# Patient Record
Sex: MALE | Race: WHITE | Employment: FULL TIME | URBAN - METROPOLITAN AREA
[De-identification: names, ages, dates, MRNs, and addresses within clinical notes are randomized per-mention and may not be internally consistent; named-entity substitution may affect disease eponyms.]

---

## 2019-05-12 LAB
CHOLESTEROL, TOTAL: 162 MG/DL
CHOLESTEROL/HDL RATIO: 11
HDLC SERPL-MCNC: 50 MG/DL (ref 35–70)
LDL CHOLESTEROL CALCULATED: 101 MG/DL (ref 0–160)
TRIGL SERPL-MCNC: 57 MG/DL
VLDLC SERPL CALC-MCNC: NORMAL MG/DL

## 2019-10-24 ASSESSMENT — ENCOUNTER SYMPTOMS
WHEEZING: 0
CONSTIPATION: 0
NAUSEA: 0
BLOOD IN STOOL: 0
BACK PAIN: 0
SHORTNESS OF BREATH: 0
DIARRHEA: 0
ABDOMINAL PAIN: 0
SINUS PAIN: 0
VOMITING: 0
COUGH: 0
SORE THROAT: 0

## 2019-10-28 ENCOUNTER — OFFICE VISIT (OUTPATIENT)
Dept: INTERNAL MEDICINE CLINIC | Age: 39
End: 2019-10-28

## 2019-10-28 VITALS — DIASTOLIC BLOOD PRESSURE: 86 MMHG | HEART RATE: 72 BPM | SYSTOLIC BLOOD PRESSURE: 122 MMHG

## 2019-10-28 DIAGNOSIS — E78.2 MIXED HYPERLIPIDEMIA: ICD-10-CM

## 2019-10-28 DIAGNOSIS — E11.9 TYPE 2 DIABETES MELLITUS WITHOUT COMPLICATION, WITHOUT LONG-TERM CURRENT USE OF INSULIN (HCC): ICD-10-CM

## 2019-10-28 DIAGNOSIS — E89.0 POSTOPERATIVE HYPOTHYROIDISM: ICD-10-CM

## 2019-10-28 DIAGNOSIS — Z76.89 ENCOUNTER TO ESTABLISH CARE WITH NEW DOCTOR: Primary | ICD-10-CM

## 2019-10-28 LAB
A/G RATIO: 1.7 (ref 1.1–2.2)
ALBUMIN SERPL-MCNC: 4 G/DL (ref 3.4–5)
ALP BLD-CCNC: 88 U/L (ref 40–129)
ALT SERPL-CCNC: 14 U/L (ref 10–40)
ANION GAP SERPL CALCULATED.3IONS-SCNC: 14 MMOL/L (ref 3–16)
AST SERPL-CCNC: 12 U/L (ref 15–37)
BASOPHILS ABSOLUTE: 0 K/UL (ref 0–0.2)
BASOPHILS RELATIVE PERCENT: 0.7 %
BILIRUB SERPL-MCNC: 0.5 MG/DL (ref 0–1)
BUN BLDV-MCNC: 10 MG/DL (ref 7–20)
CALCIUM SERPL-MCNC: 8.8 MG/DL (ref 8.3–10.6)
CHLORIDE BLD-SCNC: 101 MMOL/L (ref 99–110)
CO2: 27 MMOL/L (ref 21–32)
CREAT SERPL-MCNC: 0.7 MG/DL (ref 0.9–1.3)
CREATININE URINE: 134.5 MG/DL (ref 39–259)
EOSINOPHILS ABSOLUTE: 0.4 K/UL (ref 0–0.6)
EOSINOPHILS RELATIVE PERCENT: 8 %
GFR AFRICAN AMERICAN: >60
GFR NON-AFRICAN AMERICAN: >60
GLOBULIN: 2.4 G/DL
GLUCOSE BLD-MCNC: 217 MG/DL (ref 70–99)
HBA1C MFR BLD: 7.4 %
HCT VFR BLD CALC: 41.5 % (ref 40.5–52.5)
HEMOGLOBIN: 14.3 G/DL (ref 13.5–17.5)
LYMPHOCYTES ABSOLUTE: 1.5 K/UL (ref 1–5.1)
LYMPHOCYTES RELATIVE PERCENT: 28.1 %
MCH RBC QN AUTO: 31 PG (ref 26–34)
MCHC RBC AUTO-ENTMCNC: 34.5 G/DL (ref 31–36)
MCV RBC AUTO: 90 FL (ref 80–100)
MICROALBUMIN UR-MCNC: <1.2 MG/DL
MICROALBUMIN/CREAT UR-RTO: NORMAL MG/G (ref 0–30)
MONOCYTES ABSOLUTE: 0.3 K/UL (ref 0–1.3)
MONOCYTES RELATIVE PERCENT: 5.7 %
NEUTROPHILS ABSOLUTE: 3.1 K/UL (ref 1.7–7.7)
NEUTROPHILS RELATIVE PERCENT: 57.5 %
PDW BLD-RTO: 13.4 % (ref 12.4–15.4)
PLATELET # BLD: 212 K/UL (ref 135–450)
PMV BLD AUTO: 8 FL (ref 5–10.5)
POTASSIUM SERPL-SCNC: 4 MMOL/L (ref 3.5–5.1)
RBC # BLD: 4.61 M/UL (ref 4.2–5.9)
SODIUM BLD-SCNC: 142 MMOL/L (ref 136–145)
TOTAL PROTEIN: 6.4 G/DL (ref 6.4–8.2)
TSH SERPL DL<=0.05 MIU/L-ACNC: 0.9 UIU/ML (ref 0.27–4.2)
WBC # BLD: 5.4 K/UL (ref 4–11)

## 2019-10-28 PROCEDURE — 83036 HEMOGLOBIN GLYCOSYLATED A1C: CPT | Performed by: NURSE PRACTITIONER

## 2019-10-28 PROCEDURE — 99385 PREV VISIT NEW AGE 18-39: CPT | Performed by: NURSE PRACTITIONER

## 2019-10-28 RX ORDER — LEVOTHYROXINE SODIUM 0.12 MG/1
TABLET ORAL
Qty: 90 TABLET | Refills: 0 | Status: SHIPPED | OUTPATIENT
Start: 2019-10-28 | End: 2020-06-19 | Stop reason: SDUPTHER

## 2019-10-28 RX ORDER — LEVOTHYROXINE SODIUM 0.12 MG/1
TABLET ORAL
COMMUNITY
Start: 2015-05-19 | End: 2019-10-28 | Stop reason: SDUPTHER

## 2019-10-28 RX ORDER — METFORMIN HYDROCHLORIDE 500 MG/1
1000 TABLET, EXTENDED RELEASE ORAL
Qty: 180 TABLET | Refills: 0 | Status: SHIPPED | OUTPATIENT
Start: 2019-10-28 | End: 2020-06-19 | Stop reason: SDUPTHER

## 2019-10-28 ASSESSMENT — PATIENT HEALTH QUESTIONNAIRE - PHQ9
SUM OF ALL RESPONSES TO PHQ QUESTIONS 1-9: 1
SUM OF ALL RESPONSES TO PHQ9 QUESTIONS 1 & 2: 1
1. LITTLE INTEREST OR PLEASURE IN DOING THINGS: 0
SUM OF ALL RESPONSES TO PHQ QUESTIONS 1-9: 1
2. FEELING DOWN, DEPRESSED OR HOPELESS: 1

## 2020-03-15 ENCOUNTER — HOSPITAL ENCOUNTER (EMERGENCY)
Age: 40
Discharge: HOME | End: 2020-03-15
Attending: EMERGENCY MEDICINE
Payer: COMMERCIAL

## 2020-03-15 VITALS
TEMPERATURE: 96.7 F | HEART RATE: 76 BPM | OXYGEN SATURATION: 97 % | HEIGHT: 67 IN | SYSTOLIC BLOOD PRESSURE: 126 MMHG | DIASTOLIC BLOOD PRESSURE: 92 MMHG | WEIGHT: 190 LBS | BODY MASS INDEX: 29.82 KG/M2 | RESPIRATION RATE: 16 BRPM

## 2020-03-15 LAB
RAPID INFLUENZA  B AGN: NEGATIVE
RAPID INFLUENZA A AGN: NEGATIVE
S PYO AG THROAT QL: NEGATIVE

## 2020-03-15 PROCEDURE — 99282 EMERGENCY DEPT VISIT SF MDM: CPT

## 2020-03-15 PROCEDURE — 87804 INFLUENZA ASSAY W/OPTIC: CPT

## 2020-03-15 PROCEDURE — 87081 CULTURE SCREEN ONLY: CPT

## 2020-03-15 PROCEDURE — 87880 STREP A ASSAY W/OPTIC: CPT

## 2020-03-15 PROCEDURE — 6370000000 HC RX 637 (ALT 250 FOR IP): Performed by: EMERGENCY MEDICINE

## 2020-03-15 RX ORDER — IBUPROFEN 200 MG
400 TABLET ORAL ONCE
Status: COMPLETED | OUTPATIENT
Start: 2020-03-15 | End: 2020-03-15

## 2020-03-15 RX ADMIN — IBUPROFEN 400 MG: 200 TABLET, FILM COATED ORAL at 08:10

## 2020-03-15 ASSESSMENT — PAIN SCALES - GENERAL: PAINLEVEL_OUTOF10: 2

## 2020-03-15 NOTE — ED PROVIDER NOTES
Noncontributory    PHYSICAL EXAM  VITAL SIGNS:  Blood pressure (!) 126/92, pulse 76, temperature 96.7 °F (35.9 °C), temperature source Oral, resp. rate 16, height 5' 7\" (1.702 m), weight 190 lb (86.2 kg), SpO2 97 %. Constitutional:  42 y.o. male nontoxic, tolerates secretions normally  HENT:  Atraumatic, mucous membranes moist, no trismus, uvula midline, TMs clear, throat appears normal  Eyes:   Conjunctiva clear, no icterus  Neck:  Supple, trachea midline, no stridor, no adenopathy  Thorax & Lungs:  Respiratory effort normal, no murmur  Abdomen:  Non distended, no HSM appreciated  Back:  No deformity  Extremities:  No cyanosis, no edema  Skin:  Warm, dry, no facial or extremity rash appreciated  Neurologic:  Alert, normal coordination, normal voice and speech    DIAGNOSTIC RESULTS:    Labs Reviewed   RAPID INFLUENZA A/B ANTIGENS    Narrative:     Performed at:  [facility]  [address] [facility], [address]   Phone [phone]   STREP SCREEN GROUP A THROAT    Narrative:     Performed at:  [facility]  [address] [facility],  [place], [address]   Phone [phone]   CULTURE, BETA STREP CONFIRM PLATES     ED COURSE:    Medications administered:  Medications   ibuprofen (ADVIL;MOTRIN) tablet 400 mg (400 mg Oral Given 3/15/20 0810)     PROCEDURES:  None    CONSULTATIONS:  None    MEDICAL DECISION MAKING: Pratik Green is a 42 y.o. male who presented with sorethroat. The patient is adequately hydrated, clinically nontoxic, and does not have any findings that would suggest impending airway issues. Pratik Green was given appropriate discharge instructions. Referral to follow up provider.    Differential diagnosis:  Peritonsillar abscess, epiglottitis, retropharyngeal abscess, foreign body, Mauro's angina, dehydration, infectious mono, Strep, other    FOLLOW UP:    DEA Valero - CNP  [address] [facility] [address]  [phone]    Schedule an appointment as soon as possible for a visit       [facility] Emergency Department  [address]  Go to       FINAL IMPRESSION:    1. Acute pharyngitis, unspecified etiology      (Please note that I used voice recognition software to generate this note.   Occasionally words are mistranscribed despite my efforts to edit errors.)        Segun Alva MD  03/15/20 6906

## 2020-03-16 ENCOUNTER — TELEPHONE (OUTPATIENT)
Dept: INTERNAL MEDICINE CLINIC | Age: 40
End: 2020-03-16

## 2020-03-17 LAB — S PYO THROAT QL CULT: NORMAL

## 2020-06-19 ENCOUNTER — VIRTUAL VISIT (OUTPATIENT)
Dept: INTERNAL MEDICINE CLINIC | Age: 40
End: 2020-06-19
Payer: COMMERCIAL

## 2020-06-19 LAB — HBA1C MFR BLD: 10.5 %

## 2020-06-19 PROCEDURE — 3046F HEMOGLOBIN A1C LEVEL >9.0%: CPT | Performed by: NURSE PRACTITIONER

## 2020-06-19 PROCEDURE — 1036F TOBACCO NON-USER: CPT | Performed by: NURSE PRACTITIONER

## 2020-06-19 PROCEDURE — 83036 HEMOGLOBIN GLYCOSYLATED A1C: CPT | Performed by: NURSE PRACTITIONER

## 2020-06-19 PROCEDURE — G8419 CALC BMI OUT NRM PARAM NOF/U: HCPCS | Performed by: NURSE PRACTITIONER

## 2020-06-19 PROCEDURE — G8427 DOCREV CUR MEDS BY ELIG CLIN: HCPCS | Performed by: NURSE PRACTITIONER

## 2020-06-19 PROCEDURE — 2022F DILAT RTA XM EVC RTNOPTHY: CPT | Performed by: NURSE PRACTITIONER

## 2020-06-19 PROCEDURE — 99213 OFFICE O/P EST LOW 20 MIN: CPT | Performed by: NURSE PRACTITIONER

## 2020-06-19 RX ORDER — LEVOTHYROXINE SODIUM 0.12 MG/1
TABLET ORAL
Qty: 90 TABLET | Refills: 0 | Status: SHIPPED | OUTPATIENT
Start: 2020-06-19 | End: 2020-07-16 | Stop reason: SDUPTHER

## 2020-06-19 RX ORDER — LANCETS 30 GAUGE
1 EACH MISCELLANEOUS DAILY
Qty: 100 EACH | Refills: 0 | Status: SHIPPED | OUTPATIENT
Start: 2020-06-19 | End: 2021-05-06 | Stop reason: SDUPTHER

## 2020-06-19 RX ORDER — BLOOD PRESSURE TEST KIT
KIT MISCELLANEOUS
Qty: 100 EACH | Refills: 0 | Status: SHIPPED | OUTPATIENT
Start: 2020-06-19

## 2020-06-19 RX ORDER — METFORMIN HYDROCHLORIDE 500 MG/1
1000 TABLET, EXTENDED RELEASE ORAL
Qty: 180 TABLET | Refills: 0 | Status: SHIPPED | OUTPATIENT
Start: 2020-06-19 | End: 2020-06-19

## 2020-06-19 SDOH — ECONOMIC STABILITY: TRANSPORTATION INSECURITY
IN THE PAST 12 MONTHS, HAS THE LACK OF TRANSPORTATION KEPT YOU FROM MEDICAL APPOINTMENTS OR FROM GETTING MEDICATIONS?: NO

## 2020-06-19 SDOH — ECONOMIC STABILITY: FOOD INSECURITY: WITHIN THE PAST 12 MONTHS, THE FOOD YOU BOUGHT JUST DIDN'T LAST AND YOU DIDN'T HAVE MONEY TO GET MORE.: NEVER TRUE

## 2020-06-19 SDOH — ECONOMIC STABILITY: FOOD INSECURITY: WITHIN THE PAST 12 MONTHS, YOU WORRIED THAT YOUR FOOD WOULD RUN OUT BEFORE YOU GOT MONEY TO BUY MORE.: NEVER TRUE

## 2020-06-19 SDOH — ECONOMIC STABILITY: INCOME INSECURITY: HOW HARD IS IT FOR YOU TO PAY FOR THE VERY BASICS LIKE FOOD, HOUSING, MEDICAL CARE, AND HEATING?: NOT HARD AT ALL

## 2020-06-19 SDOH — ECONOMIC STABILITY: TRANSPORTATION INSECURITY
IN THE PAST 12 MONTHS, HAS LACK OF TRANSPORTATION KEPT YOU FROM MEETINGS, WORK, OR FROM GETTING THINGS NEEDED FOR DAILY LIVING?: NO

## 2020-06-19 ASSESSMENT — ENCOUNTER SYMPTOMS
WHEEZING: 0
ABDOMINAL PAIN: 0
VOMITING: 0
CONSTIPATION: 0
DIARRHEA: 0
COUGH: 0
NAUSEA: 0
SHORTNESS OF BREATH: 0

## 2020-06-19 ASSESSMENT — PATIENT HEALTH QUESTIONNAIRE - PHQ9
2. FEELING DOWN, DEPRESSED OR HOPELESS: 0
1. LITTLE INTEREST OR PLEASURE IN DOING THINGS: 0
SUM OF ALL RESPONSES TO PHQ9 QUESTIONS 1 & 2: 0
SUM OF ALL RESPONSES TO PHQ QUESTIONS 1-9: 0
SUM OF ALL RESPONSES TO PHQ QUESTIONS 1-9: 0

## 2020-06-19 NOTE — PROGRESS NOTES
TELEHEALTH EVALUATION -- Audio/Visual (During AEEUE-52 public health emergency)  Office Visit   6/19/2020    Subjective:  Chief Complaint   Patient presents with    Thyroid Problem     refills pended    Diabetes     needs labs     HPI:   Carmenza Moser is a 42 y.o. male who presents today for follow up. Mixed hyperlipidemia -   Uses lifestyle modifications. Hypothyroidism - States he had a thyroid mass so had his thyroid removed. Now he takes synthroid 125 mcg daily. No side effects. Has been on this dose for over 1 year per pt.     Type 2 diabetes-  Prescribed metformin 1000 mg daily- ran out of this medication 1 month ago. He is trying to exercise more- going to gym until COVID-19 outbreak when gyms closed. He states he watches his diet - \"not too bad. \" Cutting down on carbs. Does not check fasting sugars at home. No acute concerns. Review of Systems   Constitutional: Negative for chills, fatigue, fever and unexpected weight change. Eyes: Negative for visual disturbance. Respiratory: Negative for cough, shortness of breath and wheezing. Cardiovascular: Negative for chest pain, palpitations and leg swelling. Gastrointestinal: Negative for abdominal pain, constipation, diarrhea, nausea and vomiting. Skin: Negative for pallor and rash. Neurological: Negative for dizziness, weakness, light-headedness, numbness and headaches. Psychiatric/Behavioral: Negative for dysphoric mood, self-injury, sleep disturbance and suicidal ideas. The patient is not nervous/anxious.       No Known Allergies    Current Outpatient Rx   Medication Sig Dispense Refill    levothyroxine (SYNTHROID) 125 MCG tablet TAKE 1 TAB BY MOUTH UPON WAKING AND WAIT 1 HOUR BEFORE EATING ANYTHING 90 tablet 0    metFORMIN (GLUCOPHAGE) 1000 MG tablet Take 1 tablet by mouth 2 times daily (with meals) 60 tablet 0    Alcohol Swabs PADS Use with blood sugar monitoring 100 each 0    Lancets MISC 1 each by Does not apply route their individual homes. All questions answered. Pt states no further questions or concerns at this time.    Electronically signed by: Alvino Watson 06/19/20

## 2020-07-15 ASSESSMENT — ENCOUNTER SYMPTOMS
NAUSEA: 0
WHEEZING: 0
ABDOMINAL PAIN: 0
SHORTNESS OF BREATH: 0
DIARRHEA: 0
COUGH: 0
VOMITING: 0
CONSTIPATION: 0

## 2020-07-15 NOTE — PROGRESS NOTES
TELEHEALTH EVALUATION -- Audio/Visual (During PXAUB-44 public health emergency)  Office Visit   7/16/2020    Subjective:  Chief Complaint   Patient presents with    Diabetes     HPI:   Kika Ariza is a 47 y.o. male who presents today for follow up. Mixed hyperlipidemia -   Uses lifestyle modifications.     Hypothyroidism - States he had a thyroid mass so had his thyroid removed. Now he takes synthroid 125 mcg daily. No side effects. Asymptomatic.      Type 2 diabetes-  Prescribed metformin 1000 mg BID. Fasting sugars 190-300 at home. No episodes of hypoglycemia. Trying to start the Keto diet. Walking more. No acute concerns. Review of Systems   Constitutional: Negative for chills, fatigue, fever and unexpected weight change. Eyes: Negative for visual disturbance. Respiratory: Negative for cough, shortness of breath and wheezing. Cardiovascular: Negative for chest pain, palpitations and leg swelling. Gastrointestinal: Negative for abdominal pain, constipation, diarrhea, nausea and vomiting. Skin: Negative for pallor and rash. Neurological: Negative for dizziness, weakness, light-headedness, numbness and headaches. No Known Allergies    Current Outpatient Rx   Medication Sig Dispense Refill    metFORMIN (GLUCOPHAGE) 1000 MG tablet Take 1 tablet by mouth 2 times daily (with meals) 180 tablet 0    glipiZIDE (GLUCOTROL XL) 5 MG extended release tablet Take 1 tablet by mouth daily 90 tablet 0    levothyroxine (SYNTHROID) 125 MCG tablet TAKE 1 TAB BY MOUTH UPON WAKING AND WAIT 1 HOUR BEFORE EATING ANYTHING 90 tablet 0    Alcohol Swabs PADS Use with blood sugar monitoring 100 each 0    Lancets MISC 1 each by Does not apply route daily 100 each 0    blood glucose monitor kit and supplies Test 1 time a day fasting & as needed for symptoms of irregular blood glucose.  1 kit 0     Patient Active Problem List   Diagnosis    Type 2 diabetes mellitus without complication, without long-term current use of insulin (HCC)    Postoperative hypothyroidism    Mixed hyperlipidemia      Wt Readings from Last 3 Encounters:   03/15/20 190 lb (86.2 kg)     BP Readings from Last 3 Encounters:   03/15/20 (!) 126/92   10/28/19 122/86     Objective/Physical Exam:  Virtual Video Exam  Patient-Reported Vitals 7/14/2020   Patient-Reported Weight 190   Patient-Reported Height 5,7   Patient-Reported Systolic -   Patient-Reported Diastolic -   Patient-Reported Temperature 97.1    ^no access to other VS d/t VV per pt  Physical Exam  Constitutional:       General: He is not in acute distress. Appearance: Normal appearance. He is not ill-appearing. Pulmonary:      Effort: Pulmonary effort is normal. No respiratory distress. Skin:     Coloration: Skin is not jaundiced or pale. Neurological:      Mental Status: He is alert. Comments: No facial asymmetry noted   Psychiatric:         Mood and Affect: Mood normal.     Due to this being a TeleHealth encounter, evaluation of the following organ systems is limited: Vitals/Constitutional/EENT/Resp/CV/GI//MS/Neuro/Skin/Heme-Lymph-Imm. Assessment and Plan:  Isauro Rivera was seen today for diabetes. Diagnoses and all orders for this visit:    Type 2 diabetes mellitus without complication, without long-term current use of insulin (HCC)  -     POCT glycosylated hemoglobin (Hb A1C)-9.6%  - Lifestyle modifications such as exercise, weight loss and healthy diet encouraged and reviewed with the pt. - Add glipizide. Pt agreeable. Continue metformin.  -     metFORMIN (GLUCOPHAGE) 1000 MG tablet; Take 1 tablet by mouth 2 times daily (with meals)- refilled today  -     glipiZIDE (GLUCOTROL XL) 5 MG extended release tablet; Take 1 tablet by mouth daily - patient education handout provided and reviewed with the pt. - Pt will monitor for hypoglycemia and call if this occurs. Postoperative hypothyroidism  -    Asymptoamtic. Takes medication daily. Last TSH stable.  TSH next visit.    - levothyroxine (SYNTHROID) 125 MCG tablet; TAKE 1 TAB BY MOUTH UPON WAKING AND WAIT 1 HOUR BEFORE EATING ANYTHING-refilled today    Mixed hyperlipidemia   - Lifestyle modifications such as exercise, weight loss and healthy diet encouraged and reviewed with the pt. Return in about 3 months (around 10/16/2020) for annual exam and DM/HTN/TSH f/u. Pt will call if symptoms worsen or fail to improve. Tamanna Aldridge is a 47 y.o. male being evaluated by a Virtual Visit (video visit) encounter to address concerns as mentioned above. A caregiver was present when appropriate. Due to this being a TeleHealth encounter (During [facility] public health emergency), evaluation of the following organ systems was limited: Vitals/Constitutional/EENT/Resp/CV/GI//MS/Neuro/Skin/Heme-Lymph-Imm. Pursuant to the emergency declaration under the [address] [place] [address], [address] [place] [address] and the [employer] and [employer] Act, this Virtual Visit was conducted with patient's (and/or legal guardian's) consent, to reduce the patient's risk of exposure to COVID-19 and provide necessary medical care. The patient (and/or legal guardian) has also been advised to contact this office for worsening conditions or problems, and seek emergency medical treatment and/or call 911 if deemed necessary. Patient identification was verified at the start of the visit: Yes    Total time spent on this encounter: Not billed by time    Services were provided through a video synchronous discussion virtually to substitute for in-person clinic visit. Patient and provider were located at their individual homes. All questions answered. Pt states no further questions or concerns at this time.    Electronically signed by: Pat Butts 07/16/20

## 2020-07-16 ENCOUNTER — VIRTUAL VISIT (OUTPATIENT)
Dept: INTERNAL MEDICINE CLINIC | Age: 40
End: 2020-07-16
Payer: COMMERCIAL

## 2020-07-16 LAB — HBA1C MFR BLD: 9.6 %

## 2020-07-16 PROCEDURE — 2022F DILAT RTA XM EVC RTNOPTHY: CPT | Performed by: NURSE PRACTITIONER

## 2020-07-16 PROCEDURE — G8427 DOCREV CUR MEDS BY ELIG CLIN: HCPCS | Performed by: NURSE PRACTITIONER

## 2020-07-16 PROCEDURE — 1036F TOBACCO NON-USER: CPT | Performed by: NURSE PRACTITIONER

## 2020-07-16 PROCEDURE — G8419 CALC BMI OUT NRM PARAM NOF/U: HCPCS | Performed by: NURSE PRACTITIONER

## 2020-07-16 PROCEDURE — 99213 OFFICE O/P EST LOW 20 MIN: CPT | Performed by: NURSE PRACTITIONER

## 2020-07-16 PROCEDURE — 83036 HEMOGLOBIN GLYCOSYLATED A1C: CPT | Performed by: NURSE PRACTITIONER

## 2020-07-16 PROCEDURE — 3046F HEMOGLOBIN A1C LEVEL >9.0%: CPT | Performed by: NURSE PRACTITIONER

## 2020-07-16 RX ORDER — LEVOTHYROXINE SODIUM 0.12 MG/1
TABLET ORAL
Qty: 90 TABLET | Refills: 0 | Status: SHIPPED | OUTPATIENT
Start: 2020-07-16 | End: 2020-09-22 | Stop reason: SDUPTHER

## 2020-07-16 RX ORDER — GLIPIZIDE 5 MG/1
5 TABLET, FILM COATED, EXTENDED RELEASE ORAL DAILY
Qty: 90 TABLET | Refills: 0 | Status: SHIPPED | OUTPATIENT
Start: 2020-07-16 | End: 2020-10-13 | Stop reason: SDUPTHER

## 2020-07-16 NOTE — PATIENT INSTRUCTIONS
Patient Education   glipizide  Pronunciation:  GLIHANS block  Brand:  Glucotrol  What is the most important information I should know about glipizide? You should not use glipizide if you have diabetic ketoacidosis (call your doctor for treatment). What is glipizide? Glipizide is an oral diabetes medicine that helps control blood sugar levels by helping your pancreas produce insulin. Glipizide is used together with diet and exercise to improve blood sugar control in adults with type 2 diabetes mellitus. Glipizide is not for treating type 1 diabetes. Glipizide may also be used for purposes not listed in this medication guide. What should I discuss with my healthcare provider before taking glipizide? You should not use this medicine if you are allergic to glipizide, or if you have diabetic ketoacidosis (call your doctor for treatment). Tell your doctor if you have ever had:  · liver or kidney disease;  · chronic diarrhea, or a blockage in your intestines; or  · an enzyme deficiency called glucose-6-phosphate dehydrogenase deficiency (G6PD). Follow your doctor's instructions about using this medicine if you are pregnant. Blood sugar control is very important during pregnancy, and your dose needs may be different during each trimester. You should not take glipizide during the last 2 weeks of pregnancy. It may not be safe to breast-feed while using this medicine. Ask your doctor about any risk. How should I take glipizide? Follow all directions on your prescription label. Your doctor may occasionally change your dose. Do not take this medicine in larger or smaller amounts or for longer than recommended. Take the glipizide regular tablet 30 minutes before your first meal of the day. Take the glipizide extended-release tablet with your first meal of the day. Swallow the tablet whole and do not crush, chew, or break it.   Your blood sugar will need to be checked often, and you may need other blood tests at effects. Avoid driving or operating machinery until you know how this medicine will affect you. What are the possible side effects of glipizide? Get emergency medical help if you have signs of an allergic reaction: hives; difficulty breathing; swelling of your face, lips, tongue, or throat. Call your doctor at once if you have symptoms of low blood sugar:  · headache, irritability  · sweating, fast heart rate;  · dizziness, nausea; or  · hunger, feeling anxious or shaky. Common side effects may include:  · diarrhea, constipation, gas;  · dizziness, drowsiness;  · tremors; or  · skin rash, redness, or itching. This is not a complete list of side effects and others may occur. Call your doctor for medical advice about side effects. You may report side effects to FDA at 5-577-FDA-8258. What other drugs will affect glipizide? Sometimes it is not safe to use certain medications at the same time. Some drugs can affect your blood levels of other drugs you take, which may increase side effects or make the medications less effective. Many drugs can affect glipizide. This includes prescription and over-the-counter medicines, vitamins, and herbal products. Not all possible interactions are listed here. Tell your doctor about all your current medicines and any medicine you start or stop using. Where can I get more information? Your pharmacist can provide more information about glipizide. Remember, keep this and all other medicines out of the reach of children, never share your medicines with others, and use this medication only for the indication prescribed. Every effort has been made to ensure that the information provided by [address] is accurate, up-to-date, and complete, but no guarantee is made to that effect. Drug information contained herein may be time sensitive.  [employer] information has been compiled for use by healthcare practitioners and consumers in the [place] and therefore [facility] does not warrant that uses outside of the [facility] are appropriate, unless specifically indicated otherwise. [facility]'s drug information does not endorse drugs, diagnose patients or recommend therapy. [facility] drug information is an informational resource designed to assist licensed healthcare practitioners in caring for their patients and/or to serve consumers viewing this service as a supplement to, and not a substitute for, the expertise, skill, knowledge and judgment of healthcare practitioners. The absence of a warning for a given drug or drug combination in no way should be construed to indicate that the drug or drug combination is safe, effective or appropriate for any given patient. [facility] does not assume any responsibility for any aspect of healthcare administered with the aid of information [facility] provides. The information contained herein is not intended to cover all possible uses, directions, precautions, warnings, drug interactions, allergic reactions, or adverse effects. If you have questions about the drugs you are taking, check with your doctor, nurse or pharmacist.  Copyright 9406-1768 [address]. Version: 11.01. Revision date: 8/16/2018. Care instructions adapted under license by [facility] ([facility]). If you have questions about a medical condition or this instruction, always ask your healthcare professional. [place] [address] any warranty or liability for your use of this information.

## 2020-08-11 ENCOUNTER — TELEPHONE (OUTPATIENT)
Dept: INTERNAL MEDICINE CLINIC | Age: 40
End: 2020-08-11

## 2020-08-11 NOTE — TELEPHONE ENCOUNTER
Based on CDC guidelines, I recommend the pt wear a mask. This is to protect himself against COVID-19. If he would like to discuss this further, VV please.

## 2020-08-11 NOTE — TELEPHONE ENCOUNTER
Patient advised. Still requests note because the mask causes him to hyperventilate. He states the letter can be for a face shield if not a mask. Did not want to schedule appt at this time.

## 2020-08-11 NOTE — TELEPHONE ENCOUNTER
According to the CDC: \"A face shield is primarily used for eye protection for the person wearing it. At this time, it is not known what level of protection a face shield provides to people nearby from the spray of respiratory droplets from the wearer. There is currently not enough evidence to support the effectiveness of face shields for source control. Therefore, CDC does not currently recommend use of face shields as a substitute for masks. \"

## 2020-10-03 ENCOUNTER — HOSPITAL ENCOUNTER (EMERGENCY)
Age: 40
Discharge: HOME | End: 2020-10-03
Attending: EMERGENCY MEDICINE
Payer: COMMERCIAL

## 2020-10-03 VITALS
TEMPERATURE: 98.6 F | HEART RATE: 71 BPM | WEIGHT: 191 LBS | BODY MASS INDEX: 29.91 KG/M2 | OXYGEN SATURATION: 98 % | DIASTOLIC BLOOD PRESSURE: 88 MMHG | SYSTOLIC BLOOD PRESSURE: 139 MMHG | RESPIRATION RATE: 16 BRPM

## 2020-10-03 LAB — S PYO AG THROAT QL: NEGATIVE

## 2020-10-03 PROCEDURE — 87081 CULTURE SCREEN ONLY: CPT

## 2020-10-03 PROCEDURE — U0003 INFECTIOUS AGENT DETECTION BY NUCLEIC ACID (DNA OR RNA); SEVERE ACUTE RESPIRATORY SYNDROME CORONAVIRUS 2 (SARS-COV-2) (CORONAVIRUS DISEASE [COVID-19]), AMPLIFIED PROBE TECHNIQUE, MAKING USE OF HIGH THROUGHPUT TECHNOLOGIES AS DESCRIBED BY CMS-2020-01-R: HCPCS

## 2020-10-03 PROCEDURE — 87880 STREP A ASSAY W/OPTIC: CPT

## 2020-10-03 PROCEDURE — 99282 EMERGENCY DEPT VISIT SF MDM: CPT

## 2020-10-03 ASSESSMENT — PAIN DESCRIPTION - LOCATION: LOCATION: THROAT

## 2020-10-03 ASSESSMENT — PAIN SCALES - GENERAL: PAINLEVEL_OUTOF10: 3

## 2020-10-03 NOTE — ED PROVIDER NOTES
[facility] Emergency Department      Pt Name: Kathy Bales  MRN: [id number]  [place] 1980  Date of evaluation: 10/3/2020  Provider: Izzy Cortez MD  CHIEF COMPLAINT  Chief Complaint   Patient presents with    Pharyngitis     Sore throat. Boyfriend diagnosed with Bronchitis so worried about having that as well     HPI  Kathy Bales is a 47 y.o. male who presents because of sore throat. He started to have a slight cough yesterday and has a sore throat today. He denies any fever. He has been able to drink liquids well. He has had slight nausea without vomiting. He has had a small amount of diarrhea. His boyfriend was just diagnosed with bronchitis. The patient does have concerns because he works with the public. He is a  for [employer] and works at [employer] part-time as well. He denies any chest or abdominal pain. REVIEW OF SYSTEMS:  Taking liquids well, no chest pain, slight diarrhea, slight congestion Pertinent positives and negatives as per the HPI. All other review of systems reviewed and negative. Nursing notes reviewed. PAST MEDICAL HISTORY  Past Medical History:   Diagnosis Date    T2DM (type 2 diabetes mellitus) ([facility] [address]     Thyroid mass      SURGICAL HISTORY  Past Surgical History:   Procedure Laterality Date    EXTERNAL EAR SURGERY Left     THYROIDECTOMY       MEDICATIONS:  No current facility-administered medications on file prior to encounter.       Current Outpatient Medications on File Prior to Encounter   Medication Sig Dispense Refill    levothyroxine (SYNTHROID) 125 MCG tablet TAKE 1 TAB BY MOUTH UPON WAKING AND WAIT 1 HOUR BEFORE EATING ANYTHING 30 tablet 0    metFORMIN (GLUCOPHAGE) 1000 MG tablet Take 1 tablet by mouth 2 times daily (with meals) 180 tablet 0    glipiZIDE (GLUCOTROL XL) 5 MG extended release tablet Take 1 tablet by mouth daily 90 tablet 0    Alcohol Swabs PADS Use with blood sugar monitoring 100 each 0    Lancets MISC 1 each by Does not apply route daily 100 each 0    blood glucose monitor kit and supplies Test 1 time a day fasting & as needed for symptoms of irregular blood glucose. 1 kit 0     ALLERGIES  Patient has no known allergies. SOCIAL HISTORY:  Social History     Tobacco Use    Smoking status: Never Smoker    Smokeless tobacco: Never Used   Substance Use Topics    Alcohol use: Yes     Comment: rare- 5 drinks per month    Drug use: Never     IMMUNIZATIONS:  Noncontributory    PHYSICAL EXAM  VITAL SIGNS:  Blood pressure 139/88, pulse 71, temperature 98.6 °F (37 °C), temperature source Oral, resp. rate 16, weight 191 lb (86.6 kg), SpO2 98 %. Constitutional:  47 y.o. male nontoxic, tolerates secretions normally  HENT:  Atraumatic, mucous membranes moist, no trismus, uvula midline, TMs clear, throat appears red without sts  Eyes:   Conjunctiva clear, no icterus  Neck:  Supple, trachea midline, no stridor, no adenopathy  Thorax & Lungs:  Respiratory effort normal, no murmur  Abdomen:  Non distended, no HSM appreciated, soft  Back:  No deformity  Extremities:  No cyanosis, no edema  Skin:  Warm, dry, no facial or extremity rash appreciated  Neurologic:  Alert, normal coordination, normal voice and speech    DIAGNOSTIC RESULTS:    Labs Reviewed   STREP SCREEN GROUP A THROAT    Narrative:     Performed at:  [facility] [address], [place], [address]   Phone [phone]   CULTURE, BETA STREP CONFIRM PLATES   [id number]     ED COURSE:  Uneventful    PROCEDURES:  None    CONSULTATIONS:  None    MEDICAL DECISION MAKING: Evan Galvez is a 47 y.o. male who presented with sorethroat. The patient is adequately hydrated, clinically nontoxic, and does not have any findings that would suggest impending airway issues. He requests a note for both of his jobs. COVID test was sent and he will quarantine as directed until the result is known. Evan Galvez was given appropriate discharge instructions. Referral to follow up provider. Differential diagnosis:  Peritonsillar abscess, epiglottitis, retropharyngeal abscess, foreign body, Mauro's angina, dehydration, infectious mono, Strep, other    FOLLOW UP:    DEA Garibay - CNP  [address] [facility]  [phone]    Schedule an appointment as soon as possible for a visit       [facility] Emergency Department  [address]  Go to   If symptoms worsen    FINAL IMPRESSION:    1. Acute pharyngitis, unspecified etiology    2. Encounter for screening laboratory testing for COVID-19 virus      (Please note that I used voice recognition software to generate this note.   Occasionally words are mistranscribed despite my efforts to edit errors.)       Michelle Fonseca MD  10/03/20 9708

## 2020-10-03 NOTE — ED NOTES
Discharge instructions received & reviewed. Work note provided. Alert & ambulatory at this time.      Gretel Kamara RN  10/03/20 1492

## 2020-10-04 LAB — SARS-COV-2, NAA: NOT DETECTED

## 2020-10-05 LAB — S PYO THROAT QL CULT: NORMAL

## 2020-10-12 ASSESSMENT — ENCOUNTER SYMPTOMS
BACK PAIN: 0
VOMITING: 0
SINUS PAIN: 0
WHEEZING: 0
SORE THROAT: 0
CONSTIPATION: 0
NAUSEA: 0
ABDOMINAL PAIN: 0
COUGH: 0
BLOOD IN STOOL: 0
DIARRHEA: 0
SHORTNESS OF BREATH: 0

## 2020-10-13 ENCOUNTER — OFFICE VISIT (OUTPATIENT)
Dept: INTERNAL MEDICINE CLINIC | Age: 40
End: 2020-10-13
Payer: COMMERCIAL

## 2020-10-13 VITALS
TEMPERATURE: 97.2 F | SYSTOLIC BLOOD PRESSURE: 122 MMHG | DIASTOLIC BLOOD PRESSURE: 84 MMHG | WEIGHT: 200 LBS | BODY MASS INDEX: 31.39 KG/M2 | HEIGHT: 67 IN | HEART RATE: 76 BPM

## 2020-10-13 DIAGNOSIS — Z00.00 ANNUAL PHYSICAL EXAM: ICD-10-CM

## 2020-10-13 DIAGNOSIS — E11.9 TYPE 2 DIABETES MELLITUS WITHOUT COMPLICATION, WITHOUT LONG-TERM CURRENT USE OF INSULIN (HCC): ICD-10-CM

## 2020-10-13 DIAGNOSIS — Z11.4 SCREENING FOR HIV (HUMAN IMMUNODEFICIENCY VIRUS): ICD-10-CM

## 2020-10-13 DIAGNOSIS — E89.0 POSTOPERATIVE HYPOTHYROIDISM: ICD-10-CM

## 2020-10-13 DIAGNOSIS — E78.2 MIXED HYPERLIPIDEMIA: ICD-10-CM

## 2020-10-13 LAB
A/G RATIO: 2.2 (ref 1.1–2.2)
ALBUMIN SERPL-MCNC: 4.2 G/DL (ref 3.4–5)
ALP BLD-CCNC: 91 U/L (ref 40–129)
ALT SERPL-CCNC: 24 U/L (ref 10–40)
ANION GAP SERPL CALCULATED.3IONS-SCNC: 12 MMOL/L (ref 3–16)
AST SERPL-CCNC: 13 U/L (ref 15–37)
BILIRUB SERPL-MCNC: 0.5 MG/DL (ref 0–1)
BUN BLDV-MCNC: 12 MG/DL (ref 7–20)
CALCIUM SERPL-MCNC: 8.7 MG/DL (ref 8.3–10.6)
CHLORIDE BLD-SCNC: 102 MMOL/L (ref 99–110)
CHOLESTEROL, FASTING: 171 MG/DL (ref 0–199)
CO2: 24 MMOL/L (ref 21–32)
CREAT SERPL-MCNC: 0.7 MG/DL (ref 0.9–1.3)
ESTIMATED AVERAGE GLUCOSE: 208.7 MG/DL
GFR AFRICAN AMERICAN: >60
GFR NON-AFRICAN AMERICAN: >60
GLOBULIN: 1.9 G/DL
GLUCOSE BLD-MCNC: 242 MG/DL (ref 70–99)
HBA1C MFR BLD: 8.9 %
HDLC SERPL-MCNC: 43 MG/DL (ref 40–60)
LDL CHOLESTEROL CALCULATED: 106 MG/DL
POTASSIUM SERPL-SCNC: 4.4 MMOL/L (ref 3.5–5.1)
SODIUM BLD-SCNC: 138 MMOL/L (ref 136–145)
TOTAL PROTEIN: 6.1 G/DL (ref 6.4–8.2)
TRIGLYCERIDE, FASTING: 111 MG/DL (ref 0–150)
TSH SERPL DL<=0.05 MIU/L-ACNC: 0.94 UIU/ML (ref 0.27–4.2)
VLDLC SERPL CALC-MCNC: 22 MG/DL

## 2020-10-13 PROCEDURE — 99395 PREV VISIT EST AGE 18-39: CPT | Performed by: NURSE PRACTITIONER

## 2020-10-13 RX ORDER — LEVOTHYROXINE SODIUM 0.12 MG/1
TABLET ORAL
Qty: 90 TABLET | Refills: 0 | Status: SHIPPED | OUTPATIENT
Start: 2020-10-13 | End: 2021-01-19 | Stop reason: SDUPTHER

## 2020-10-13 RX ORDER — GLIPIZIDE 5 MG/1
5 TABLET, FILM COATED, EXTENDED RELEASE ORAL DAILY
Qty: 90 TABLET | Refills: 0 | Status: SHIPPED | OUTPATIENT
Start: 2020-10-13 | End: 2020-10-14 | Stop reason: SDUPTHER

## 2020-10-13 ASSESSMENT — PATIENT HEALTH QUESTIONNAIRE - PHQ9
1. LITTLE INTEREST OR PLEASURE IN DOING THINGS: 0
2. FEELING DOWN, DEPRESSED OR HOPELESS: 0
SUM OF ALL RESPONSES TO PHQ QUESTIONS 1-9: 0
SUM OF ALL RESPONSES TO PHQ QUESTIONS 1-9: 0
SUM OF ALL RESPONSES TO PHQ9 QUESTIONS 1 & 2: 0

## 2020-10-13 NOTE — PROGRESS NOTES
Annual Exam Office Visit   10/13/2020    Subjective:  Chief Complaint   Patient presents with    Annual Exam     HPI:   Kael Barajas is a 47 y.o. male who presents to the clinic today for an annual exam.    Mixed hyperlipidemia -   Uses lifestyle modifications.     Hypothyroidism - States he had a thyroid mass so had his thyroid removed. Now he takes synthroid 125 mcg daily. No side effects. Asymptomatic.      Type 2 diabetes-  Prescribed metformin 1000 mg BID and last visit, glipizide 5 mg daily was added. Fasting sugars 140-150 at home. Started working a new job \"that has a lot of exercise involved. \" No episodes of hypoglycemia. States he had an eye exam last month. Signing release today. No acute concerns. Review of Systems   Constitutional: Negative for chills, fatigue, fever and unexpected weight change. HENT: Negative for congestion, postnasal drip, sinus pain, sore throat and tinnitus. Respiratory: Negative for cough, shortness of breath and wheezing. Cardiovascular: Negative for chest pain, palpitations and leg swelling. Gastrointestinal: Negative for abdominal pain, blood in stool, constipation, diarrhea, nausea and vomiting. Genitourinary: Negative for dysuria, frequency, hematuria and urgency. Musculoskeletal: Negative for back pain, gait problem, myalgias and neck pain. Skin: Negative for pallor and rash. Neurological: Negative for dizziness, weakness, light-headedness, numbness and headaches. Psychiatric/Behavioral: Negative for behavioral problems, confusion, dysphoric mood, sleep disturbance and suicidal ideas. The patient is not nervous/anxious.       No Known Allergies     Family History   Problem Relation Age of Onset    Diabetes Mother     Hypertension Father     Diabetes Brother     Depression Brother     Hypertension Maternal Aunt     Stroke Maternal Aunt     Diabetes Maternal Aunt     Diabetes Maternal Grandmother     Diabetes Maternal Grandfather     Heart Attack Maternal Grandfather     Diabetes Paternal Grandmother     Alzheimer's Disease Paternal Grandmother     No Known Problems Brother     Cancer Maternal Aunt     Prostate Cancer Neg Hx      Current Outpatient Rx   Medication Sig Dispense Refill    levothyroxine (SYNTHROID) 125 MCG tablet TAKE 1 TAB BY MOUTH UPON WAKING AND WAIT 1 HOUR BEFORE EATING ANYTHING 90 tablet 0    metFORMIN (GLUCOPHAGE) 1000 MG tablet Take 1 tablet by mouth 2 times daily (with meals) 180 tablet 0    glipiZIDE (GLUCOTROL XL) 5 MG extended release tablet Take 1 tablet by mouth daily 90 tablet 0    Alcohol Swabs PADS Use with blood sugar monitoring 100 each 0    Lancets MISC 1 each by Does not apply route daily 100 each 0    blood glucose monitor kit and supplies Test 1 time a day fasting & as needed for symptoms of irregular blood glucose.  1 kit 0     Social History     Socioeconomic History    Marital status: Single     Spouse name: Not on file    Number of children: Not on file    Years of education: Not on file    Highest education level: Not on file   Occupational History    Not on file   Social Needs    Financial resource strain: Not hard at all    Food insecurity     Worry: Never true     Inability: Never true   [employer] needs     Medical: No     Non-medical: No   Tobacco Use    Smoking status: Never Smoker    Smokeless tobacco: Never Used   Substance and Sexual Activity    Alcohol use: Yes     Comment: rare- 1-2 drinks per month    Drug use: Never    Sexual activity: Yes   Lifestyle    Physical activity     Days per week: Not on file     Minutes per session: Not on file    Stress: Not on file   Relationships    Social connections     Talks on phone: Not on file     Gets together: Not on file     Attends Anabaptism service: Not on file     Active member of club or organization: Not on file     Attends meetings of clubs or organizations: Not on file     Relationship status: Not on file   [place]in [place] Intimate partner violence     Fear of current or ex partner: Not on file     Emotionally abused: Not on file     Physically abused: Not on file     Forced sexual activity: Not on file   Other Topics Concern    Not on file   Social History Narrative    Moved from [facility] in 2019. Works for [employer]. He enjoys going to the [employer]. Past Medical History:   Diagnosis Date    HLD (hyperlipidemia)     T2DM (type 2 diabetes mellitus) ([facility] [address]     Thyroid mass      Patient Active Problem List   Diagnosis    Type 2 diabetes mellitus without complication, without long-term current use of insulin (HCC)    Postoperative hypothyroidism    Mixed hyperlipidemia      Wt Readings from Last 3 Encounters:   10/13/20 200 lb (90.7 kg)   10/03/20 191 lb (86.6 kg)   03/15/20 190 lb (86.2 kg)     BP Readings from Last 3 Encounters:   10/13/20 122/84   10/03/20 139/88   03/15/20 (!) 126/92     PHQ-9 Total Score: 0 (10/13/2020  8:02 AM)    Objective/Physical Exam:  /84   Pulse 76   Temp 97.2 °F (36.2 °C) (Temporal)   Ht 5' 7\" (1.702 m)   Wt 200 lb (90.7 kg)   BMI 31.32 kg/m²   Body mass index is 31.32 kg/m². Physical Exam  Vitals signs reviewed. Constitutional:       General: He is not in acute distress. Appearance: He is well-developed. He is not diaphoretic. HENT:      Head: Normocephalic and atraumatic. Right Ear: Tympanic membrane and external ear normal.      Left Ear: Tympanic membrane and external ear normal.   Eyes:      Conjunctiva/sclera: Conjunctivae normal.      Pupils: Pupils are equal, round, and reactive to light. Cardiovascular:      Rate and Rhythm: Normal rate and regular rhythm. Pulmonary:      Effort: Pulmonary effort is normal. No respiratory distress. Breath sounds: Normal breath sounds. No wheezing or rales. Chest:      Chest wall: No tenderness. Abdominal:      General: Bowel sounds are normal. There is no distension. Palpations: Abdomen is soft.       Tenderness: There is no abdominal tenderness. There is no guarding. Musculoskeletal: Normal range of motion. General: No tenderness or deformity. Skin:     General: Skin is warm and dry. Coloration: Skin is not pale. Findings: No erythema or rash. Neurological:      Mental Status: He is alert and oriented to person, place, and time. Coordination: Coordination normal.   Psychiatric:         Mood and Affect: Mood normal.       Assessment and Plan:  Blanca Sargent was seen today for annual exam.    Diagnoses and all orders for this visit:    Annual physical exam  -    Lifestyle modifications such as exercise, weight loss and healthy diet encouraged and reviewed with the pt. - Labs today  - COMPREHENSIVE METABOLIC PANEL; Future    Mixed hyperlipidemia  -     Lipid, Fasting; Future    Type 2 diabetes mellitus without complication, without long-term current use of insulin (HCC)  -    No side effects on the current regimen. No episodes of hypoglycemia. - Lifestyle modifications such as exercise, weight loss and healthy diet encouraged and reviewed with the pt.   - HEMOGLOBIN A1C; Today  - Recommended increasing lifestyle modification  -     metFORMIN (GLUCOPHAGE) 1000 MG tablet; Take 1 tablet by mouth 2 times daily (with meals)- refilled today  -     glipiZIDE (GLUCOTROL XL) 5 MG extended release tablet; Take 1 tablet by mouth daily- refilled today  - Recommend 6-week follow-up with blood sugar log to ensure fasting sugars are to goal    Postoperative hypothyroidism  -    Taking medications as prescribed per patient. Asymptomatic.  - Will reevaluate TSH today. Continue current regimen  - TSH without Reflex; Future  -     levothyroxine (SYNTHROID) 125 MCG tablet; TAKE 1 TAB BY MOUTH UPON WAKING AND WAIT 1 HOUR BEFORE EATING ANYTHING-refilled today    Screening for HIV (human immunodeficiency virus)  -    Patient asymptomatic. Agreeable to screening  - HIV Screen;  Future    Return in about 5 weeks (around 11/19/2020) for blood sugar fasting log review, or sooner if needed. Pt will call if symptoms worsen or fail to improve. All questions answered. Pt states no further questions or concerns at this time.    Electronically signed by: Katherine Hill 10/13/20

## 2020-10-14 LAB
HIV AG/AB: NORMAL
HIV ANTIGEN: NORMAL
HIV-1 ANTIBODY: NORMAL
HIV-2 AB: NORMAL

## 2020-10-14 RX ORDER — GLIPIZIDE 10 MG/1
10 TABLET, FILM COATED, EXTENDED RELEASE ORAL DAILY
Qty: 90 TABLET | Refills: 0 | Status: SHIPPED | OUTPATIENT
Start: 2020-10-14 | End: 2021-01-19 | Stop reason: SDUPTHER

## 2020-11-18 ASSESSMENT — ENCOUNTER SYMPTOMS
CONSTIPATION: 0
COUGH: 0
WHEEZING: 0
ABDOMINAL PAIN: 0
SHORTNESS OF BREATH: 0
NAUSEA: 0
DIARRHEA: 0
VOMITING: 0

## 2020-11-18 NOTE — PROGRESS NOTES
TELEHEALTH EVALUATION -- Audio/Visual (During YGWSK-38 public [facility] emergency)  Office Visit   11/19/2020    Subjective:  Chief Complaint   Patient presents with    Blood Sugar Problem     \"not running too bad\" did not have numbers on him      HPI:   Sandra Guerrero is a 47 y.o. male who presents today for follow up.     Type 2 diabetes-  Prescribed metformin 1000 mg BID and last visit, glipizide 5 mg daily was added. Fasting sugars at home- are unavailable - states he is at work. He thinks they run around 90s-100s. Denies episodes of hypoglycemia. Has a job which is more active. Going to the gym 2-3x/week. States his diet is \"so-so. \"    Denies acute concerns. Review of Systems   Constitutional: Negative for chills, fatigue, fever and unexpected weight change. Eyes: Negative for visual disturbance. Respiratory: Negative for cough, shortness of breath and wheezing. Cardiovascular: Negative for chest pain, palpitations and leg swelling. Gastrointestinal: Negative for abdominal pain, constipation, diarrhea, nausea and vomiting. Skin: Negative for pallor and rash. Neurological: Negative for dizziness, weakness, light-headedness, numbness and headaches. Psychiatric/Behavioral: Negative for dysphoric mood, self-injury, sleep disturbance and suicidal ideas. The patient is not nervous/anxious.       No Known Allergies    Current Outpatient Rx   Medication Sig Dispense Refill    glipiZIDE (GLUCOTROL XL) 10 MG extended release tablet Take 1 tablet by mouth daily 90 tablet 0    levothyroxine (SYNTHROID) 125 MCG tablet TAKE 1 TAB BY MOUTH UPON WAKING AND WAIT 1 HOUR BEFORE EATING ANYTHING 90 tablet 0    metFORMIN (GLUCOPHAGE) 1000 MG tablet Take 1 tablet by mouth 2 times daily (with meals) 180 tablet 0    Alcohol Swabs PADS Use with blood sugar monitoring 100 each 0    Lancets MISC 1 each by Does not apply route daily 100 each 0    blood glucose monitor kit and supplies Test 1 time a day fasting & as needed for symptoms of irregular blood glucose. 1 kit 0       Patient Active Problem List   Diagnosis    Type 2 diabetes mellitus without complication, without long-term current use of insulin (HCC)    Postoperative hypothyroidism    Mixed hyperlipidemia        Wt Readings from Last 3 Encounters:   10/13/20 200 lb (90.7 kg)   10/03/20 191 lb (86.6 kg)   03/15/20 190 lb (86.2 kg)     BP Readings from Last 3 Encounters:   10/13/20 122/84   10/03/20 139/88   03/15/20 (!) 126/92     Objective/Physical Exam:  Virtual Video Exam  Patient-Reported Vitals 11/19/2020   Patient-Reported Weight 191lbs   Patient-Reported Height -   Patient-Reported Systolic -   Patient-Reported Diastolic -   Patient-Reported Temperature 98.3     ^no access to other VS d/t VV per pt. Physical Exam  Constitutional:       General: He is not in acute distress. Appearance: Normal appearance. He is not ill-appearing. Pulmonary:      Effort: Pulmonary effort is normal. No respiratory distress. Skin:     Coloration: Skin is not jaundiced or pale. Neurological:      Mental Status: He is alert. Comments: No facial asymmetry noted   Psychiatric:         Mood and Affect: Mood normal.     Due to this being a TeleHealth encounter, evaluation of the following organ systems is limited: Vitals/Constitutional/EENT/Resp/CV/GI//MS/Neuro/Skin/Heme-Lymph-Imm. Assessment and Plan:  Oneil Gamez was seen today for blood sugar problem. Diagnoses and all orders for this visit:    Type 2 diabetes mellitus without complication, without long-term current use of insulin ([facility] [address]   - Patient does not have fasting logs with him today. However, he believes his sugars are running in the 90s to low 100s. He denies episodes of hypoglycemia. No side effects.   - Continue current regimen. - Lifestyle modifications such as exercise, weight loss and healthy diet encouraged and reviewed with the pt. Return for as previously scheduled or sooner if needed.  Pt will call if symptoms worsen or fail to improve. Milton Snider is a 47 y.o. male being evaluated by a Virtual Visit (video visit) encounter to address concerns as mentioned above. A caregiver was present when appropriate. Due to this being a TeleHealth encounter (During QLBXM-99 public health emergency), evaluation of the following organ systems was limited: Vitals/Constitutional/EENT/Resp/CV/GI//MS/Neuro/Skin/Heme-Lymph-Imm. Pursuant to the emergency declaration under the [address] [place] [address], [address] [place] [address] authority and the [employer] Act, this Virtual Visit was conducted with patient's (and/or legal guardian's) consent, to reduce the patient's risk of exposure to COVID-19 and provide necessary medical care. The patient (and/or legal guardian) has also been advised to contact this office for worsening conditions or problems, and seek emergency medical treatment and/or call 911 if deemed necessary. Patient identification was verified at the start of the visit: Yes    Total time spent on this encounter: Not billed by time    Services were provided through a video synchronous discussion virtually to substitute for in-person clinic visit. Patient and provider were located at their individual homes. All questions answered. Pt states no further questions or concerns at this time.    Electronically signed by: John Ponce 11/19/20

## 2020-11-19 ENCOUNTER — VIRTUAL VISIT (OUTPATIENT)
Dept: INTERNAL MEDICINE CLINIC | Age: 40
End: 2020-11-19

## 2020-11-19 PROCEDURE — 99213 OFFICE O/P EST LOW 20 MIN: CPT | Performed by: NURSE PRACTITIONER

## 2020-11-19 PROCEDURE — 3052F HG A1C>EQUAL 8.0%<EQUAL 9.0%: CPT | Performed by: NURSE PRACTITIONER

## 2020-11-30 ENCOUNTER — TELEPHONE (OUTPATIENT)
Dept: INTERNAL MEDICINE CLINIC | Age: 40
End: 2020-11-30

## 2020-11-30 NOTE — TELEPHONE ENCOUNTER
Called [facility], no answer. Left message to fax over diabetic eye exam report or to call office back with any concerns or questions.

## 2020-12-22 NOTE — TELEPHONE ENCOUNTER
Spoke with someone at [facility]. She was unsure that patient had been seen there. She will look through their records and call back if he has not.

## 2021-01-15 NOTE — PROGRESS NOTES
Office Visit  1/19/2021    Subjective:  Chief Complaint   Patient presents with    Rash     allergy reaction- itchy legs     Diabetes     HPI:   Gabriel Arreola is a 47 y.o. male who presents to the clinic today for follow up. Type 2 diabetes-  Prescribed metformin 1000 mg BID and last visit, glipizide 10 mg daily was added. Fasting sugars at home- 180. Denies episodes of hypoglycemia.   Has a job which is more active. No formal exercise. States he is eating \"way too much fast food\" the last month. Mixed hyperlipidemia -   Uses lifestyle modifications.     Hypothyroidism  States he had a thyroid mass so had his thyroid removed. Now he takes synthroid 125 mcg daily. No side effects. Asymptomatic.     Rash- bilateral shins/lower extremities. Red and itching. Occurs intermittently. Denies drainage. No new lotions/creams/detergents/medications. No fevers/chills. Can occur after doing laundry. Tried [employer] without relief. Review of Systems   Constitutional: Negative for chills, fatigue, fever and unexpected weight change. Eyes: Negative for visual disturbance. Respiratory: Negative for cough, shortness of breath and wheezing. Cardiovascular: Negative for chest pain, palpitations and leg swelling. Gastrointestinal: Negative for abdominal pain, constipation, diarrhea, nausea and vomiting. Skin: Positive for rash. Negative for pallor. Neurological: Negative for dizziness, weakness, light-headedness, numbness and headaches.      No Known Allergies    Current Outpatient Rx   Medication Sig Dispense Refill    glipiZIDE (GLUCOTROL XL) 5 MG extended release tablet Take two tablets in the morning and one tab in the evening for 1 week and then increase to two tablets twice daily 120 tablet 0    levothyroxine (SYNTHROID) 125 MCG tablet TAKE 1 TAB BY MOUTH UPON WAKING AND WAIT 1 HOUR BEFORE EATING ANYTHING 90 tablet 0 Mental Status: He is alert and oriented to person, place, and time. Coordination: Coordination normal.   Psychiatric:         Mood and Affect: Mood normal.       Assessment and Plan:  Brian Estes was seen today for other. Diagnoses and all orders for this visit:    Type 2 diabetes mellitus without complication, without long-term current use of insulin ([facility])  -     A1C- 10.8%  - Reviewed use of insulin. Patient strongly declines. He states he is not interested. - Recommended increasing glipizide to 15 mg daily for a week and then 20 mg daily based on fasting sugars. Close follow-up in 1 month recommended. Patient is agreeable to the plan. He will call with episodes of hypoglycemia. - Lifestyle modifications such as exercise, weight loss and healthy diet encouraged and reviewed with the pt.  - glipiZIDE (GLUCOTROL XL) 5 MG extended release tablet; Take two tablets in the morning and one tab in the evening for 1 week and then increase to two tablets twice daily-increased dose  -     metFORMIN (GLUCOPHAGE) 1000 MG tablet; Take 1 tablet by mouth 2 times daily (with meals)-refilled today    Mixed hyperlipidemia   - Lifestyle modifications such as exercise, weight loss and healthy diet encouraged and reviewed with the pt. Postoperative hypothyroidism  - Last TSH stable. Denies side effects.  - levothyroxine (SYNTHROID) 125 MCG tablet; TAKE 1 TAB BY MOUTH UPON WAKING AND WAIT 1 HOUR BEFORE EATING ANYTHING- refilled today    Rash  -    Not worsening. Occurs after doing laundry.   - Not in armpits/chest/back/fingers. - Pt states he is changing detergents. Recommended patient keep a symptom log to try and evaluate the cause  - Steroid cream recommended for current rash. Patient is agreeable to plan  - triamcinolone (KENALOG) 0.1 % cream; Apply topically 2 times daily as needed. - patient education handout provided and reviewed with the pt.   - Pt will call if symptoms worsen or fail to improve Return in about 4 weeks (around 2/16/2021) for blood sugar f/u, or sooner if needed. Pt will call if symptoms worsen or fail to improve. All questions answered. Pt states no further questions or concerns at this time.    Electronically signed by: Sohan Palacio 01/19/21

## 2021-01-19 ENCOUNTER — OFFICE VISIT (OUTPATIENT)
Dept: INTERNAL MEDICINE CLINIC | Age: 41
End: 2021-01-19
Payer: COMMERCIAL

## 2021-01-19 VITALS
BODY MASS INDEX: 29.6 KG/M2 | DIASTOLIC BLOOD PRESSURE: 84 MMHG | WEIGHT: 189 LBS | SYSTOLIC BLOOD PRESSURE: 120 MMHG | HEART RATE: 84 BPM | TEMPERATURE: 96.4 F

## 2021-01-19 DIAGNOSIS — R21 RASH: ICD-10-CM

## 2021-01-19 DIAGNOSIS — E89.0 POSTOPERATIVE HYPOTHYROIDISM: ICD-10-CM

## 2021-01-19 DIAGNOSIS — E78.2 MIXED HYPERLIPIDEMIA: ICD-10-CM

## 2021-01-19 DIAGNOSIS — E11.9 TYPE 2 DIABETES MELLITUS WITHOUT COMPLICATION, WITHOUT LONG-TERM CURRENT USE OF INSULIN (HCC): Primary | ICD-10-CM

## 2021-01-19 LAB — HBA1C MFR BLD: 10.8 %

## 2021-01-19 PROCEDURE — 99214 OFFICE O/P EST MOD 30 MIN: CPT | Performed by: NURSE PRACTITIONER

## 2021-01-19 PROCEDURE — 1036F TOBACCO NON-USER: CPT | Performed by: NURSE PRACTITIONER

## 2021-01-19 PROCEDURE — 83036 HEMOGLOBIN GLYCOSYLATED A1C: CPT | Performed by: NURSE PRACTITIONER

## 2021-01-19 PROCEDURE — G8484 FLU IMMUNIZE NO ADMIN: HCPCS | Performed by: NURSE PRACTITIONER

## 2021-01-19 PROCEDURE — G8427 DOCREV CUR MEDS BY ELIG CLIN: HCPCS | Performed by: NURSE PRACTITIONER

## 2021-01-19 PROCEDURE — 3046F HEMOGLOBIN A1C LEVEL >9.0%: CPT | Performed by: NURSE PRACTITIONER

## 2021-01-19 PROCEDURE — G8417 CALC BMI ABV UP PARAM F/U: HCPCS | Performed by: NURSE PRACTITIONER

## 2021-01-19 PROCEDURE — 2022F DILAT RTA XM EVC RTNOPTHY: CPT | Performed by: NURSE PRACTITIONER

## 2021-01-19 RX ORDER — TRIAMCINOLONE ACETONIDE 1 MG/G
CREAM TOPICAL
Qty: 45 G | Refills: 0 | Status: SHIPPED | OUTPATIENT
Start: 2021-01-19

## 2021-01-19 RX ORDER — GLIPIZIDE 5 MG/1
TABLET, FILM COATED, EXTENDED RELEASE ORAL
Qty: 120 TABLET | Refills: 0 | Status: SHIPPED | OUTPATIENT
Start: 2021-01-19 | End: 2021-05-06 | Stop reason: SDUPTHER

## 2021-01-19 RX ORDER — LEVOTHYROXINE SODIUM 0.12 MG/1
TABLET ORAL
Qty: 90 TABLET | Refills: 0 | Status: SHIPPED | OUTPATIENT
Start: 2021-01-19 | End: 2021-05-06 | Stop reason: SDUPTHER

## 2021-01-19 ASSESSMENT — ENCOUNTER SYMPTOMS
SHORTNESS OF BREATH: 0
ABDOMINAL PAIN: 0
WHEEZING: 0
DIARRHEA: 0
VOMITING: 0
NAUSEA: 0
COUGH: 0
CONSTIPATION: 0

## 2021-01-19 ASSESSMENT — PATIENT HEALTH QUESTIONNAIRE - PHQ9
2. FEELING DOWN, DEPRESSED OR HOPELESS: 0
SUM OF ALL RESPONSES TO PHQ QUESTIONS 1-9: 0
SUM OF ALL RESPONSES TO PHQ QUESTIONS 1-9: 0

## 2021-01-19 NOTE — PATIENT INSTRUCTIONS
Increase glipizide as discussed- on script. Continue metformin. Keep a fasting blood sugar log and bring to next visit. Patient Education        triamcinolone topical  Pronunciation:  Jil Severe am SIN oh lone  Brand:  DermasilkRx SDS [place], Dermasorb TA, DermaWerx SDS [place], [place], Pedralva, [place], Triderm  What is the most important information I should know about triamcinolone topical?  Follow all directions on your medicine label and package. Tell each of your healthcare providers about all your medical conditions, allergies, and all medicines you use. What is triamcinolone topical?  Triamcinolone is a potent steroid that helps reduce inflammation in the body. Triamcinolone topical (for the skin) is used to treat the inflammation and itching caused by skin conditions that respond to steroid medication. The dental paste form of triamcinolone topical is used to treat mouth ulcers. Triamcinolone topical may also be used for purposes not listed in this medication guide. What should I discuss with my healthcare provider before using triamcinolone topical?  You should not use triamcinolone if you are allergic to it. Tell your doctor if you have ever had:  · any type of skin infection;  · a skin reaction to any steroid medicine;  · liver disease; or  · an adrenal gland disorder. Steroid medicines can increase the glucose (sugar) levels in your blood or urine. Tell your doctor if you have diabetes. Tell your doctor if you are pregnant or breastfeeding. If you apply triamcinolone to your chest, avoid areas that may come into contact with the nursing baby's mouth. How should I use triamcinolone topical?  Follow all directions on your prescription label and read all medication guides or instruction sheets. Use the medicine exactly as directed. Triamcinolone topical cream, lotion, ointment, or spray is for use only on the skin. Triamcinolone dental paste is applied directly onto an ulcer inside the mouth and left in place. Do not swallow this medicine. Wash your hands before and after using triamcinolone topical, unless you are using this medicine to treat the skin on your hands. Apply a thin layer of medicine to the affected skin. Do not apply this medicine over a large area of skin unless your doctor has told you to. Do not cover the treated skin area with a bandage or other covering unless your doctor tells you to. Covering treated areas can increase the amount of medicine absorbed through your skin and may cause harmful effects. If you are treating the diaper area, do not use plastic pants or tight-fitting diapers. To use the dental paste, press a small dab onto the mouth ulcer but do not rub in the medicine. The dab will form a thin film that should be left in place for several hours. Triamcinolone dental paste is usually applied at bedtime and/or after meals. Follow your doctor's instructions. Call your doctor if your symptoms do not improve, or if they get worse. A mouth ulcer should improve within 1 week of using triamcinolone dental paste. You should stop using this medicine once your symptoms are controlled. Store at room temperature away from moisture and heat. What happens if I miss a dose? Apply the medicine as soon as you can, but skip the missed dose if it is almost time for your next dose. Do not apply two doses at one time. What happens if I overdose? Seek emergency medical attention or call the Poison Help line at 1-921.920.1995 if anyone has accidentally swallowed the medication. High doses or long-term use of steroid medicine can lead to thinning skin, easy bruising, changes in body fat (especially in your face, neck, back, and waist), increased acne or facial hair, menstrual problems, impotence, or loss of interest in sex. What should I avoid while using triamcinolone topical?  Avoid getting this medicine in your eyes. Avoid using other topical steroid medications on the areas you treat with triamcinolone unless your doctor tells you to. Do not use triamcinolone topical to treat any condition that has not been checked by your doctor. Do not share this medicine with another person, even if they have the same symptoms you have. What are the possible side effects of triamcinolone topical?  Get emergency medical help if you have signs of an allergic reaction: hives; difficult breathing; swelling of your face, lips, tongue, or throat. Call your doctor at once if you have:  · worsening of your skin condition;  · redness, warmth, swelling, oozing, or severe irritation of any treated skin;  · blurred vision, tunnel vision, eye pain, or seeing halos around lights;  · high blood sugar --increased thirst, increased urination, dry mouth, fruity breath odor; or  · possible signs of absorbing this medicine through your skin or gums --weight gain (especially in your face or your upper back and torso), slow wound healing, thinning or discolored skin, increased body hair, muscle weakness, nausea, diarrhea, tiredness, mood changes, menstrual changes, sexual changes. Children can absorb larger amounts of this medicine through the skin and may be more likely to have side effects such as growth delay, headaches, or pain behind the eyes. A baby using this medicine may have a bulging soft spot (the top of the head where the skull hasn't yet grown together).   Common side effects may include:  · burning, itching, dryness, or other irritation of treated skin; · redness or crusting around your hair follicles;  · redness or itching around your mouth;  · allergic skin reaction;  · stretch marks;  · acne, increased body hair growth;  · thinning skin or discoloration; or  · white or \"pruned\" appearance of the skin (caused by covering treated skin with a tight bandage or other covering). This is not a complete list of side effects and others may occur. Call your doctor for medical advice about side effects. You may report side effects to FDA at 8-420-FDA-2343. What other drugs will affect triamcinolone topical?  Medicine used on the skin is not likely to be affected by other drugs you use. But many drugs can interact with each other. Tell each of your healthcare providers about all medicines you use, including prescription and over-the-counter medicines, vitamins, and herbal products. Where can I get more information? Your pharmacist can provide more information about triamcinolone topical.  Remember, keep this and all other medicines out of the reach of children, never share your medicines with others, and use this medication only for the indication prescribed. Every effort has been made to ensure that the information provided by [facility] [address] is accurate, up-to-date, and complete, but no guarantee is made to that effect. Drug information contained herein may be time sensitive. [facility] information has been compiled for use by healthcare practitioners and consumers in the [place] and therefore [facility] does not warrant that uses outside of the [place] are appropriate, unless specifically indicated otherwise. [facility]'s drug information does not endorse drugs, diagnose patients or recommend therapy. [facility]'s drug information is an informational resource designed to assist licensed healthcare practitioners in caring for their patients and/or to serve consumers viewing this service as a supplement to, and not a substitute for, the expertise, skill, knowledge and judgment of healthcare practitioners. The absence of a warning for a given drug or drug combination in no way should be construed to indicate that the drug or drug combination is safe, effective or appropriate for any given patient. [facility] does not assume any responsibility for any aspect of healthcare administered with the aid of information [facility] provides. The information contained herein is not intended to cover all possible uses, directions, precautions, warnings, drug interactions, allergic reactions, or adverse effects. If you have questions about the drugs you are taking, check with your doctor, nurse or pharmacist.  Copyright 0659-6426 [address]. Version: 8.02. Revision date: 12/27/2019. Care instructions adapted under license by [facility] ([facility]). If you have questions about a medical condition or this instruction, always ask your healthcare professional. [place] [address] any warranty or liability for your use of this information.

## 2021-03-30 ENCOUNTER — IMMUNIZATION (OUTPATIENT)
Dept: PRIMARY CARE CLINIC | Age: 41
End: 2021-03-30
Payer: OTHER GOVERNMENT

## 2021-03-30 PROCEDURE — 0011A COVID-19, MODERNA VACCINE 100MCG/0.5ML DOSE: CPT

## 2021-03-30 PROCEDURE — 91301 COVID-19, MODERNA VACCINE 100MCG/0.5ML DOSE: CPT

## 2021-04-27 ENCOUNTER — IMMUNIZATION (OUTPATIENT)
Dept: PRIMARY CARE CLINIC | Age: 41
End: 2021-04-27
Payer: COMMERCIAL

## 2021-04-27 PROCEDURE — 91301 COVID-19, MODERNA VACCINE 100MCG/0.5ML DOSE: CPT | Performed by: FAMILY MEDICINE

## 2021-04-27 PROCEDURE — 0012A COVID-19, MODERNA VACCINE 100MCG/0.5ML DOSE: CPT | Performed by: FAMILY MEDICINE

## 2021-04-28 NOTE — PROGRESS NOTES
needed. 45 g 0    Alcohol Swabs PADS Use with blood sugar monitoring 100 each 0    blood glucose monitor kit and supplies Test 1 time a day fasting & as needed for symptoms of irregular blood glucose. 1 kit 0     Patient Active Problem List   Diagnosis    Type 2 diabetes mellitus without complication, without long-term current use of insulin (HCC)    Postoperative hypothyroidism    Mixed hyperlipidemia     Wt Readings from Last 3 Encounters:   05/06/21 199 lb (90.3 kg)   01/19/21 189 lb (85.7 kg)   10/13/20 200 lb (90.7 kg)     BP Readings from Last 3 Encounters:   05/06/21 126/80   01/19/21 120/84   10/13/20 122/84     Objective/Physical Exam:  /80   Pulse 84   Wt 199 lb (90.3 kg)   SpO2 97%   BMI 31.17 kg/m²   Body mass index is 31.17 kg/m². Physical Exam  Vitals signs reviewed. Constitutional:       General: He is not in acute distress. Appearance: He is well-developed. He is not diaphoretic. HENT:      Head: Normocephalic and atraumatic. Eyes:      Pupils: Pupils are equal, round, and reactive to light. Cardiovascular:      Rate and Rhythm: Normal rate and regular rhythm. Pulmonary:      Effort: Pulmonary effort is normal. No respiratory distress. Breath sounds: Normal breath sounds. No wheezing or rales. Chest:      Chest wall: No tenderness. Musculoskeletal:      Comments: Diabetic foot exam: 2+ DP and PT pulses. Sensation intact to monofilament testing. No callous or ulcers visualized; toenails are normal in appearance, proprioception intact in the bilateral feet   Skin:     General: Skin is warm and dry. Neurological:      Mental Status: He is alert and oriented to person, place, and time. Coordination: Coordination normal.   Psychiatric:         Mood and Affect: Mood normal.       Assessment and Plan:  Elvis Muller was seen today for 3 month follow-up.     Diagnoses and all orders for this visit:    Type 2 diabetes mellitus without complication, without long-term current use of insulin (HCC)  -     POCT glycosylated hemoglobin (Hb A1C)-7.3%  - Pt not taking glipizide as prescribed. However, A1c improved significantly. Performing lifestyle modifications. Recommend the patient increase glipizide from 5 mg daily to 5 mg twice daily. Continue Metformin as prescribed. - Lifestyle modifications such as exercise, weight loss and healthy diet encouraged and reviewed with the pt.  -     glipiZIDE (GLUCOTROL XL) 5 MG extended release tablet; Take one tablet twice daily-increase dose  -     metFORMIN (GLUCOPHAGE) 1000 MG tablet; Take 1 tablet by mouth 2 times daily (with meals)- refilled today  -     Lancets MISC; 1 each by Does not apply route daily  -     MICROALBUMIN / CREATININE URINE RATIO; Future  -     Diabetic Foot Exam-see physical exam    Mixed hyperlipidemia   - Continue with lifestyle modifications    Postoperative hypothyroidism  -    Asymptomatic. Denies side effects. Reviewed last TSH- stable. - Continue current regimen  - levothyroxine (SYNTHROID) 125 MCG tablet; TAKE 1 TAB BY MOUTH UPON WAKING AND WAIT 1 HOUR BEFORE EATING ANYTHING-refilled today    Return in about 3 months (around 8/6/2021) for DM/HLD/TSH f/u, or sooner if needed. Pt will call if symptoms worsen or fail to improve. All questions answered. Pt states no further questions or concerns at this time.    Electronically signed by: Cassy Jara 05/06/21

## 2021-05-06 ENCOUNTER — OFFICE VISIT (OUTPATIENT)
Dept: INTERNAL MEDICINE CLINIC | Age: 41
End: 2021-05-06
Payer: COMMERCIAL

## 2021-05-06 VITALS
WEIGHT: 199 LBS | HEART RATE: 84 BPM | OXYGEN SATURATION: 97 % | SYSTOLIC BLOOD PRESSURE: 126 MMHG | DIASTOLIC BLOOD PRESSURE: 80 MMHG | BODY MASS INDEX: 31.17 KG/M2

## 2021-05-06 DIAGNOSIS — E78.2 MIXED HYPERLIPIDEMIA: ICD-10-CM

## 2021-05-06 DIAGNOSIS — E11.9 TYPE 2 DIABETES MELLITUS WITHOUT COMPLICATION, WITHOUT LONG-TERM CURRENT USE OF INSULIN (HCC): Primary | ICD-10-CM

## 2021-05-06 DIAGNOSIS — E11.9 TYPE 2 DIABETES MELLITUS WITHOUT COMPLICATION, WITHOUT LONG-TERM CURRENT USE OF INSULIN (HCC): ICD-10-CM

## 2021-05-06 DIAGNOSIS — E89.0 POSTOPERATIVE HYPOTHYROIDISM: ICD-10-CM

## 2021-05-06 LAB
CREATININE URINE: 163.1 MG/DL (ref 39–259)
HBA1C MFR BLD: 7.3 %
MICROALBUMIN UR-MCNC: <1.2 MG/DL
MICROALBUMIN/CREAT UR-RTO: NORMAL MG/G (ref 0–30)

## 2021-05-06 PROCEDURE — 2022F DILAT RTA XM EVC RTNOPTHY: CPT | Performed by: NURSE PRACTITIONER

## 2021-05-06 PROCEDURE — 99214 OFFICE O/P EST MOD 30 MIN: CPT | Performed by: NURSE PRACTITIONER

## 2021-05-06 PROCEDURE — G8427 DOCREV CUR MEDS BY ELIG CLIN: HCPCS | Performed by: NURSE PRACTITIONER

## 2021-05-06 PROCEDURE — 1036F TOBACCO NON-USER: CPT | Performed by: NURSE PRACTITIONER

## 2021-05-06 PROCEDURE — 3046F HEMOGLOBIN A1C LEVEL >9.0%: CPT | Performed by: NURSE PRACTITIONER

## 2021-05-06 PROCEDURE — G8417 CALC BMI ABV UP PARAM F/U: HCPCS | Performed by: NURSE PRACTITIONER

## 2021-05-06 PROCEDURE — 83036 HEMOGLOBIN GLYCOSYLATED A1C: CPT | Performed by: NURSE PRACTITIONER

## 2021-05-06 RX ORDER — GLIPIZIDE 5 MG/1
TABLET, FILM COATED, EXTENDED RELEASE ORAL
Qty: 180 TABLET | Refills: 0 | Status: SHIPPED | OUTPATIENT
Start: 2021-05-06 | End: 2022-04-18 | Stop reason: SDUPTHER

## 2021-05-06 RX ORDER — LEVOTHYROXINE SODIUM 0.12 MG/1
TABLET ORAL
Qty: 90 TABLET | Refills: 0 | Status: SHIPPED | OUTPATIENT
Start: 2021-05-06 | End: 2022-04-18 | Stop reason: SDUPTHER

## 2021-05-06 RX ORDER — LANCETS 30 GAUGE
1 EACH MISCELLANEOUS DAILY
Qty: 100 EACH | Refills: 0 | Status: SHIPPED | OUTPATIENT
Start: 2021-05-06

## 2021-05-06 ASSESSMENT — ENCOUNTER SYMPTOMS
DIARRHEA: 0
SHORTNESS OF BREATH: 0
ABDOMINAL PAIN: 0
WHEEZING: 0
COUGH: 0
NAUSEA: 0
VOMITING: 0
CONSTIPATION: 0

## 2021-10-25 DIAGNOSIS — E89.0 POSTOPERATIVE HYPOTHYROIDISM: ICD-10-CM

## 2021-10-25 RX ORDER — LEVOTHYROXINE SODIUM 125 UG/1
TABLET ORAL
Qty: 90 TABLET | Refills: 0 | OUTPATIENT
Start: 2021-10-25

## 2021-10-26 RX ORDER — LEVOTHYROXINE SODIUM 0.12 MG/1
TABLET ORAL
Qty: 90 TABLET | Refills: 0 | OUTPATIENT
Start: 2021-10-26

## 2022-01-09 ENCOUNTER — HOSPITAL ENCOUNTER (EMERGENCY)
Age: 42
Discharge: HOME | End: 2022-01-09

## 2022-01-09 VITALS
OXYGEN SATURATION: 95 % | SYSTOLIC BLOOD PRESSURE: 119 MMHG | HEART RATE: 63 BPM | RESPIRATION RATE: 16 BRPM | DIASTOLIC BLOOD PRESSURE: 76 MMHG | TEMPERATURE: 98 F

## 2022-01-09 DIAGNOSIS — Z20.822 PERSON UNDER INVESTIGATION FOR COVID-19: ICD-10-CM

## 2022-01-09 DIAGNOSIS — J06.9 ACUTE UPPER RESPIRATORY INFECTION: Primary | ICD-10-CM

## 2022-01-09 LAB — SARS-COV-2, NAAT: DETECTED

## 2022-01-09 PROCEDURE — 87635 SARS-COV-2 COVID-19 AMP PRB: CPT

## 2022-01-09 PROCEDURE — 99283 EMERGENCY DEPT VISIT LOW MDM: CPT

## 2022-01-09 RX ORDER — GUAIFENESIN/DEXTROMETHORPHAN 100-10MG/5
5 SYRUP ORAL 3 TIMES DAILY PRN
Qty: 120 ML | Refills: 0 | Status: SHIPPED | OUTPATIENT
Start: 2022-01-09 | End: 2022-01-19

## 2022-01-09 ASSESSMENT — ENCOUNTER SYMPTOMS
BACK PAIN: 0
DIARRHEA: 1
CONSTIPATION: 0
COUGH: 1
VOMITING: 0
VOICE CHANGE: 0
ABDOMINAL PAIN: 0
STRIDOR: 0
SHORTNESS OF BREATH: 0
WHEEZING: 0
TROUBLE SWALLOWING: 0
NAUSEA: 0
SORE THROAT: 1
RHINORRHEA: 1
COLOR CHANGE: 0

## 2022-01-09 NOTE — ED PROVIDER NOTES
[address] [facility]        Pt Name: Robert Casarez  MRN: [id number]  [place] 1980  Date of evaluation: 1/9/2022  Provider: Heather Cordoba PA-C  PCP: DEA Dumont CNP  Note Started: 6:05 PM EST       VOLODYMYR. I have evaluated this patient. My supervising physician was available for consultation. CHIEF COMPLAINT       Chief Complaint   Patient presents with    Covid Testing     sore throat, cough, congestion, HA, and diarrhea. wants covid tested       HISTORY OF PRESENT ILLNESS   (Location, Timing/Onset, Context/Setting, Quality, Duration, Modifying Factors, Severity, Associated Signs and Symptoms)  Note limiting factors. Chief Complaint: Concern for COVID-19    Robert Casarez is a 46 y.o. male who presents to the emergency department complaining of sore throat, cough, congestion, headache and diarrhea. He is concerned that he might have COVID-19. Denies dizziness, lightheadedness, vision changes, chest pain, shortness of breath, palpitations, hemoptysis, abdominal pain, nausea, vomiting. No stridor, tripoding or drooling is noted. Nursing Notes were all reviewed and agreed with or any disagreements were addressed in the HPI. REVIEW OF SYSTEMS    (2-9 systems for level 4, 10 or more for level 5)     Review of Systems   Constitutional: Negative for chills and fever. HENT: Positive for congestion, postnasal drip, rhinorrhea and sore throat. Negative for dental problem, drooling, ear discharge, ear pain, tinnitus, trouble swallowing and voice change. Eyes: Negative for visual disturbance. Respiratory: Positive for cough. Negative for shortness of breath, wheezing and stridor. Cardiovascular: Negative for chest pain, palpitations and leg swelling. Gastrointestinal: Positive for diarrhea. Negative for abdominal pain, constipation, nausea and vomiting. Genitourinary: Negative.     Musculoskeletal: Negative for back pain, neck pain and neck stiffness. Skin: Negative for color change, pallor, rash and wound. Neurological: Positive for headaches. Negative for dizziness, tremors, seizures, syncope, facial asymmetry, speech difficulty, weakness, light-headedness and numbness. Psychiatric/Behavioral: Negative for confusion. All other systems reviewed and are negative. Positives and Pertinent negatives as per HPI. Except as noted above in the ROS, all other systems were reviewed and negative. PAST MEDICAL HISTORY     Past Medical History:   Diagnosis Date    HLD (hyperlipidemia)     T2DM (type 2 diabetes mellitus) ([facility] [address]     Thyroid mass          SURGICAL HISTORY     Past Surgical History:   Procedure Laterality Date    EXTERNAL EAR SURGERY Left     THYROIDECTOMY           CURRENTMEDICATIONS       Previous Medications    ALCOHOL SWABS PADS    Use with blood sugar monitoring    BLOOD GLUCOSE MONITOR KIT AND SUPPLIES    Test 1 time a day fasting & as needed for symptoms of irregular blood glucose. GLIPIZIDE (GLUCOTROL XL) 5 MG EXTENDED RELEASE TABLET    Take one tablet twice daily. LANCETS MISC    1 each by Does not apply route daily    LEVOTHYROXINE (SYNTHROID) 125 MCG TABLET    TAKE 1 TAB BY MOUTH UPON WAKING AND WAIT 1 HOUR BEFORE EATING ANYTHING    METFORMIN (GLUCOPHAGE) 1000 MG TABLET    Take 1 tablet by mouth 2 times daily (with meals)    TRIAMCINOLONE (KENALOG) 0.1 % CREAM    Apply topically 2 times daily as needed. ALLERGIES     Patient has no known allergies.     FAMILYHISTORY       Family History   Problem Relation Age of Onset    Diabetes Mother     Hypertension Father     Diabetes Brother     Depression Brother     Hypertension Maternal Aunt     Stroke Maternal Aunt     Diabetes Maternal Aunt     Diabetes Maternal Grandmother     Diabetes Maternal Grandfather     Heart Attack Maternal Grandfather     Diabetes Paternal Grandmother     Alzheimer's Disease Paternal Grandmother     No Known Problems Brother     Cancer Maternal Aunt     Prostate Cancer Neg Hx           SOCIAL HISTORY       Social History     Tobacco Use    Smoking status: Never Smoker    Smokeless tobacco: Never Used   Vaping Use    Vaping Use: Never used   Substance Use Topics    Alcohol use: Yes     Comment: rare- 1-2 drinks per month    Drug use: Never       SCREENINGS             PHYSICAL EXAM    (up to 7 for level 4, 8 or more for level 5)     ED Triage Vitals   BP Temp Temp src Pulse Resp SpO2 Height Weight   01/09/22 1741 01/09/22 1740 -- 01/09/22 1740 01/09/22 1740 01/09/22 1740 -- --   119/76 98 °F (36.7 °C)  63 16 95 %         Physical Exam  Vitals and nursing note reviewed. Constitutional:       Appearance: Normal appearance. He is well-developed. He is not toxic-appearing or diaphoretic. HENT:      Head: Normocephalic and atraumatic. Jaw: There is normal jaw occlusion. Right Ear: Hearing, tympanic membrane, ear canal and external ear normal.      Left Ear: Hearing, tympanic membrane, ear canal and external ear normal.      Nose: Congestion present. Right Sinus: No maxillary sinus tenderness or frontal sinus tenderness. Left Sinus: No maxillary sinus tenderness or frontal sinus tenderness. Mouth/Throat:      Lips: Pink. Mouth: Mucous membranes are moist.      Dentition: No dental tenderness. Tongue: No lesions. Tongue does not deviate from midline. Palate: No mass and lesions. Pharynx: Oropharynx is clear. Uvula midline. No uvula swelling. Tonsils: No tonsillar exudate or tonsillar abscesses. 1+ on the right. 1+ on the left. Eyes:      General: No scleral icterus. Right eye: No discharge. Left eye: No discharge. Extraocular Movements: Extraocular movements intact. Conjunctiva/sclera: Conjunctivae normal.      Pupils: Pupils are equal, round, and reactive to light.    Neck:      Trachea: Trachea and phonation normal.      Meningeal: Brudzinski's sign and Kernig's sign absent. Cardiovascular:      Rate and Rhythm: Normal rate. Pulmonary:      Effort: Pulmonary effort is normal.      Breath sounds: Normal breath sounds. Abdominal:      General: Bowel sounds are normal.      Palpations: Abdomen is soft. Tenderness: There is no abdominal tenderness. There is no right CVA tenderness or left CVA tenderness. Musculoskeletal:         General: Normal range of motion. Cervical back: Full passive range of motion without pain, normal range of motion and neck supple. Lymphadenopathy:      Cervical: No cervical adenopathy. Skin:     General: Skin is warm and dry. Capillary Refill: Capillary refill takes less than 2 seconds. Coloration: Skin is not jaundiced or pale. Findings: No bruising, erythema, lesion or rash. Neurological:      General: No focal deficit present. Mental Status: He is alert and oriented to person, place, and time. Cranial Nerves: No cranial nerve deficit (II-XII intact). Psychiatric:         Mood and Affect: Mood normal.         Behavior: Behavior normal.         DIAGNOSTIC RESULTS   LABS:    Labs Reviewed   [id number]   COVID-19   COVID-19   COVID-19       When ordered only abnormal lab results are displayed. All other labs were within normal range or not returned as of this dictation. EKG: When ordered, EKG's are interpreted by the Emergency Department Physician in the absence of a cardiologist.  Please see their note for interpretation of EKG. RADIOLOGY:   Non-plain film images such as CT, Ultrasound and MRI are read by the radiologist. Plain radiographic images are visualized and preliminarily interpreted by the ED Provider with the below findings:        Interpretation per the Radiologist below, if available at the time of this note:    No orders to display     No results found.         PROCEDURES   Unless otherwise noted below, none Procedures    CRITICAL CARE TIME   N/A    CONSULTS:  None      EMERGENCY DEPARTMENT COURSE and DIFFERENTIAL DIAGNOSIS/MDM:   Vitals:    Vitals:    01/09/22 1740 01/09/22 1741   BP:  119/76   Pulse: 63    Resp: 16    Temp: 98 °F (36.7 °C)    SpO2: 95%        Patient was given the following medications:  Medications - No data to display      This patient presents complaining of cough, congestion, sore throat, headache and diarrhea. he is primarily concerned about COVID-19. Covid swab results pending. He was given isolation precautions. Sent home with medicine to address his symptoms. Advised to follow-up with PCP for recheck and may return to ED per discharge instructions.     My suspicion is low for ACS, PE, myocarditis, pericarditis, endocarditis, acute pulmonary edema, pleural effusion, pericardial effusion, cardiac tamponade, cardiomyopathy, CHF exacerbation, thoracic aortic dissection, esophageal rupture, other life-threatening arrhythmia,  hemothorax, pulmonary contusion, subcutaneous emphysema, flail chest, pneumo mediastinum, rib fracture, pneumonia, pneumothorax, ARDS, carotid dissection, sinus abscess, acute fracture, acute CVA, ICH, SAH, TIA, meningitis, encephalitis, pseudotumor cerebri, temporal arteritis, sentinel bleed from ruptured aneurysm, hypertensive urgency or emergency, subdural hematoma, epidural hematoma, cerebellar compromise, posterior stroke, bells palsy, TMJ syndrome, trigeminal neuralgia, dental abscess, Mauro angina, otitis, acute strep pharyngitis, peritonsillar or tonsillar abscess, retropharyngeal abscess, bacterial tracheitis, epiglottitis, meningitis, encephalitis, foreign body, angioedema, anaphylaxis, mononucleosis, mumps, lymphoma, leukemia, asthma exacerbation, osteomyelitis, mastoiditis, sepsis, DKA, acute surgical abdomen, obstruction, perforation, abscess, mesenteric ischemia, AAA, dissection, cholecystitis, cholangitis, pancreatitis, appendicitis, C. diff colitis, diverticulitis, volvulus, incarcerated hernia, necrotizing fasciitis,  testicular torsion, epididymitis, varicocele, hydrocele, orchitis, incarcerated hernia, Solange gangrene, pyelonephritis, perinephric abscess, kidney stone, urosepsis, fistula, intussusception, or other concerning pathology. FINAL IMPRESSION      1. Acute upper respiratory infection    2.  Person under investigation for COVID-19          DISPOSITION/PLAN   DISPOSITION Decision To Discharge 01/09/2022 05:49:07 PM      PATIENT REFERRED TO:  Jeff Eli, [address]  [phone]    In 3 days      [facility] Emergency Department  [address] [facility]  [phone]    If symptoms worsen      DISCHARGE MEDICATIONS:  New Prescriptions    GUAIFENESIN-DEXTROMETHORPHAN (ROBITUSSIN DM) 100-10 MG/5ML SYRUP    Take 5 mLs by mouth 3 times daily as needed for Cough    LORATADINE-PSEUDOEPHEDRINE (CLARITIN-D 12HR) 5-120 MG PER EXTENDED RELEASE TABLET    Take 1 tablet by mouth 2 times daily       DISCONTINUED MEDICATIONS:  Discontinued Medications    No medications on file              (Please note that portions of this note were completed with a voice recognition program.  Efforts were made to edit the dictations but occasionally words are mis-transcribed.)    Dea Shaw PA-C (electronically signed)           Dea Shaw PA-C  01/09/22 0243

## 2022-01-10 ENCOUNTER — CARE COORDINATION (OUTPATIENT)
Dept: CARE COORDINATION | Age: 42
End: 2022-01-10

## 2022-01-10 NOTE — CARE COORDINATION
Patient contacted regarding COVID-19 diagnosis. Discussed COVID-19 related testing which was available at this time. Test results were positive. Patient informed of results, if available? Yes. Ambulatory Care Manager contacted the patient by telephone to perform post discharge assessment. Call within 2 business days of discharge: Yes. Verified name and  with patient as identifiers. Provided introduction to self, and explanation of the CTN/ACM role, and reason for call due to risk factors for infection and/or exposure to COVID-19. Symptoms reviewed with patient who verbalized the following symptoms: cough, no new symptoms, no worsening symptoms and sore throat, headache and nasal congestion. Due to no new or worsening symptoms encounter was not routed to provider for escalation. Discussed follow-up appointments. If no appointment was previously scheduled, appointment scheduling offered: given number to referral line. [facility] follow up appointment(s): No future appointments. Non-[facility] follow up appointment(s): N/A    Non-face-to-face services provided:  Scheduled appointment with PCP-given referral line number. Obtained and reviewed discharge summary and/or continuity of care documents  Reviewed and followed up on pending diagnostic tests and treatments-patient advised of positive COVID test.   Education of patient/family/caregiver/guardian to support self-management-discussed COVID Care, Isolation, and RED flags. Assessment and support for treatment adherence and medication management-discussed OTC medications, hydration, and rest with patient. Advance Care Planning:   Does patient have an Advance Directive:  not on file. Educated patient about risk for severe COVID-19 due to risk factors according to CDC guidelines. ACM reviewed discharge instructions, medical action plan and red flag symptoms with the patient who verbalized understanding. Discussed COVID vaccination status: Yes.  Education provided on COVID-19 vaccination as appropriate. Discussed exposure protocols and quarantine with CDC Guidelines. Patient was given an opportunity to verbalize any questions and concerns and agrees to contact ACM or health care provider for questions related to their healthcare. Reviewed and educated patient on any new and changed medications related to discharge diagnosis     Was patient discharged with a pulse oximeter? No Discussed and confirmed pulse oximeter discharge instructions and when to notify provider or seek emergency care. ACM provided contact information. Plan for follow-up call in 5-7 days based on severity of symptoms and risk factors. ACM made outreach to patient to discuss positive COVID test results. Patient states that he is not feeling any worse but not much better. He complains of ST, cough, congestion, and HA. He no longer has complaints of diarrhea. ACM discussed COVID Care, Isolation and RED flags with patient. He will continue to monitor and go to ED with any RED Flag concerns. He does not currently have PCP. He was given referral line number to call and establish with new PCP. ACM provided her number for non emergent questions or concerns. [facility] arranged to follow up with patient in 1 week.

## 2022-01-17 ENCOUNTER — CARE COORDINATION (OUTPATIENT)
Dept: CARE COORDINATION | Age: 42
End: 2022-01-17

## 2022-01-17 NOTE — CARE COORDINATION
Follow Up Call    Challenges to be reviewed by the provider   Additional needs identified to be addressed with provider: No  none                 Encounter was not routed to provider for escalation. Method of communication with provider:  none. Contacted the patient by telephone to follow up after ED. Status: improved  Interventions to address identified needs: Obtained and reviewed discharge summary and/or continuity of care documents  Education of patient/family/caregiver/guardian to support self-management-patient continues to rest and stay well hydrated. Assessment and support for treatment adherence and medication management-patient has not needed OTC medications for a few days. He continues to self monitor his symptoms. [facility] follow up appointment(s): No future appointments. Non-[facility] follow up appointment(s): n/a   Follow up appointment completed? No.    Provided contact information for future needs. No further follow-up call indicated based on severity of symptoms and risk factors. Plan for next call: no further outreach at this time. [facility] made follow up outreach to patient. He states that he is improving. His cough has \"toned down\", his sorethroat, congestion and headache have resolved. He states that he has not needed to take medications for several days now. ACM discussed importance of continuing to rest when needed and staying hydrated. Discussed RED flags with patient and advised to go to ED with any. Discussed contacting PCP with any new or changing symptoms. He agreed with the plan and had no questions or concerns. Patient has [facility] contact number to call with any non emergent questions or concerns.    Sim Sauceda RN

## 2022-04-17 ASSESSMENT — ENCOUNTER SYMPTOMS
VOMITING: 0
ABDOMINAL PAIN: 0
SHORTNESS OF BREATH: 0
WHEEZING: 0
NAUSEA: 0
COUGH: 0
DIARRHEA: 0
CHEST TIGHTNESS: 0

## 2022-04-18 ENCOUNTER — OFFICE VISIT (OUTPATIENT)
Dept: FAMILY MEDICINE CLINIC | Age: 42
End: 2022-04-18

## 2022-04-18 VITALS
HEIGHT: 67 IN | WEIGHT: 192 LBS | TEMPERATURE: 97.3 F | HEART RATE: 68 BPM | BODY MASS INDEX: 30.13 KG/M2 | DIASTOLIC BLOOD PRESSURE: 86 MMHG | SYSTOLIC BLOOD PRESSURE: 118 MMHG | OXYGEN SATURATION: 98 %

## 2022-04-18 DIAGNOSIS — Z00.00 ENCOUNTER FOR MEDICAL EXAMINATION TO ESTABLISH CARE: ICD-10-CM

## 2022-04-18 DIAGNOSIS — E78.2 MIXED HYPERLIPIDEMIA: ICD-10-CM

## 2022-04-18 DIAGNOSIS — E11.9 TYPE 2 DIABETES MELLITUS WITHOUT COMPLICATION, WITHOUT LONG-TERM CURRENT USE OF INSULIN (HCC): ICD-10-CM

## 2022-04-18 DIAGNOSIS — E89.0 POSTOPERATIVE HYPOTHYROIDISM: Primary | ICD-10-CM

## 2022-04-18 LAB
CHP ED QC CHECK: NORMAL
GLUCOSE BLD-MCNC: 306 MG/DL

## 2022-04-18 PROCEDURE — 99203 OFFICE O/P NEW LOW 30 MIN: CPT | Performed by: CLINICAL NURSE SPECIALIST

## 2022-04-18 PROCEDURE — 82962 GLUCOSE BLOOD TEST: CPT | Performed by: CLINICAL NURSE SPECIALIST

## 2022-04-18 RX ORDER — LEVOTHYROXINE SODIUM 0.12 MG/1
TABLET ORAL
Qty: 90 TABLET | Refills: 0 | Status: SHIPPED | OUTPATIENT
Start: 2022-04-18 | End: 2022-07-28 | Stop reason: SDUPTHER

## 2022-04-18 RX ORDER — GLIPIZIDE 5 MG/1
TABLET, FILM COATED, EXTENDED RELEASE ORAL
Qty: 90 TABLET | Refills: 0 | Status: SHIPPED | OUTPATIENT
Start: 2022-04-18 | End: 2022-08-02 | Stop reason: SDUPTHER

## 2022-04-18 ASSESSMENT — PATIENT HEALTH QUESTIONNAIRE - PHQ9
2. FEELING DOWN, DEPRESSED OR HOPELESS: 0
SUM OF ALL RESPONSES TO PHQ9 QUESTIONS 1 & 2: 0
SUM OF ALL RESPONSES TO PHQ QUESTIONS 1-9: 0
SUM OF ALL RESPONSES TO PHQ QUESTIONS 1-9: 0
1. LITTLE INTEREST OR PLEASURE IN DOING THINGS: 0
SUM OF ALL RESPONSES TO PHQ QUESTIONS 1-9: 0
SUM OF ALL RESPONSES TO PHQ QUESTIONS 1-9: 0

## 2022-04-18 NOTE — PROGRESS NOTES
SUBJECTIVE:    Patient ID:  Irma Marsh is a 46 y.o. male      Patient is here to establish care and for medication check for hypothyroidism, hyperlipidemia and diabetes. States he has been out of his medication for 1-2 months. His blood sugars have been running 150's to 200's. Patient's allergies, medication, medical, surgical, family and social history were reviewed and updated accordingly. Thyroid: Patient presents for evaluation of hypothyroidism. Current symptoms include denies fatigue, weight changes, heat/cold intolerance, bowel/skin changes or CVS symptoms. Patient denies fatigue, weight changes, heat/cold intolerance, bowel/skin changes or CVS symptoms. Hyperlipidemia  This is a chronic problem. The current episode started more than 1 year ago. The problem is controlled. Recent lipid tests were reviewed and are high. Pertinent negatives include no chest pain, focal sensory loss, focal weakness, leg pain, myalgias or shortness of breath. Current antihyperlipidemic treatment includes diet change and exercise. The current treatment provides significant improvement of lipids. Risk factors for coronary artery disease include male sex, dyslipidemia and diabetes mellitus. Diabetes  He presents for his follow-up diabetic visit. He has type 2 diabetes mellitus. His disease course has been stable. Pertinent negatives for hypoglycemia include no headaches or nervousness/anxiousness. Pertinent negatives for diabetes include no chest pain, no foot paresthesias, no polydipsia, no polyphagia and no polyuria. Risk factors for coronary artery disease include dyslipidemia, diabetes mellitus and male sex. Current diabetic treatment includes oral agent (dual therapy). He is following a generally healthy diet. His overall blood glucose range is 180-200 mg/dl.        Current Outpatient Medications on File Prior to Visit   Medication Sig Dispense Refill    loratadine-pseudoephedrine (CLARITIN-D 12HR) 5-120 MG per extended release tablet Take 1 tablet by mouth 2 times daily 20 tablet 0    Lancets MISC 1 each by Does not apply route daily 100 each 0    Alcohol Swabs PADS Use with blood sugar monitoring 100 each 0    blood glucose monitor kit and supplies Test 1 time a day fasting & as needed for symptoms of irregular blood glucose. 1 kit 0    triamcinolone (KENALOG) 0.1 % cream Apply topically 2 times daily as needed. (Patient not taking: Reported on 4/18/2022) 45 g 0     No current facility-administered medications on file prior to visit. Past Medical History:   Diagnosis Date    HLD (hyperlipidemia)     T2DM (type 2 diabetes mellitus) ([facility] [address]     Thyroid mass      Past Surgical History:   Procedure Laterality Date    EXTERNAL EAR SURGERY Left     THYROIDECTOMY       Family History   Problem Relation Age of Onset    Diabetes Mother     Hypertension Father     Diabetes Brother     Depression Brother     No Known Problems Brother     Diabetes Maternal Grandmother     Diabetes Maternal Grandfather     Heart Attack Maternal Grandfather     Diabetes Paternal Grandmother     Alzheimer's Disease Paternal Grandmother     Hypertension Maternal Aunt     Stroke Maternal Aunt     Diabetes Maternal Aunt     Cancer Maternal Aunt         back/vert    Prostate Cancer Neg Hx      Social History     Socioeconomic History    Marital status: Single     Spouse name: Not on file    Number of children: Not on file    Years of education: Not on file    Highest education level: Not on file   Occupational History    Not on file   Tobacco Use    Smoking status: Never Smoker    Smokeless tobacco: Never Used   Vaping Use    Vaping Use: Never used   Substance and Sexual Activity    Alcohol use: Yes     Comment: rare- 1-2 drinks per month    Drug use: Never    Sexual activity: Yes   Other Topics Concern    Not on file   Social History Narrative    Moved from [facility] in 2019. Works for [employer].  He enjoys going to the [employer]. Social Determinants of Health     Financial Resource Strain:     Difficulty of Paying Living Expenses: Not on file   Food Insecurity:     Worried About Running Out of Food in the Last Year: Not on file    Cami of Food in the Last Year: Not on file   Transportation Needs:     Lack of Transportation (Medical): Not on file    Lack of Transportation (Non-Medical): Not on file   Physical Activity:     Days of Exercise per Week: Not on file    Minutes of Exercise per Session: Not on file   Stress:     Feeling of Stress : Not on file   Social Connections:     Frequency of Communication with Friends and Family: Not on file    Frequency of Social Gatherings with Friends and Family: Not on file    Attends [employer] Services: Not on file    Active Member of [address] [place] [address] or Organizations: Not on file    Attends Club or Organization Meetings: Not on file    Marital Status: Not on file   Intimate Partner Violence:     Fear of Current or Ex-Partner: Not on file    Emotionally Abused: Not on file    Physically Abused: Not on file    Sexually Abused: Not on file   Housing Stability:     Unable to Pay for Housing in the Last Year: Not on file    Number of [place] in the Last Year: Not on file    Unstable Housing in the Last Year: Not on file       Review of Systems   Constitutional: Negative for chills and fever. Eyes: Negative for visual disturbance. Respiratory: Negative for cough, chest tightness, shortness of breath and wheezing. Cardiovascular: Negative for chest pain, palpitations and leg swelling. Gastrointestinal: Negative for abdominal pain, diarrhea, nausea and vomiting. Constipation: occ. Endocrine: Negative for polydipsia, polyphagia and polyuria. Genitourinary: Negative for dysuria, frequency and urgency. Musculoskeletal: Negative for arthralgias and myalgias. Skin: Negative for rash. Neurological: Negative for focal weakness and headaches. Psychiatric/Behavioral: Negative for dysphoric mood and sleep disturbance. The patient is not nervous/anxious. OBJECTIVE:    Physical Exam  Vitals and nursing note reviewed. Constitutional:       General: He is not in acute distress. Appearance: Normal appearance. He is well-developed. He is not ill-appearing. HENT:      Head: Normocephalic and atraumatic. Right Ear: External ear normal.      Left Ear: External ear normal.      Nose: Nose normal.      Mouth/Throat:      Mouth: Mucous membranes are moist.   Eyes:      Conjunctiva/sclera: Conjunctivae normal.      Pupils: Pupils are equal, round, and reactive to light. Neck:      Vascular: No carotid bruit. Trachea: No tracheal deviation. Cardiovascular:      Rate and Rhythm: Normal rate and regular rhythm. Heart sounds: Normal heart sounds. Pulmonary:      Effort: Pulmonary effort is normal. No respiratory distress. Breath sounds: Normal breath sounds. No wheezing or rales. Chest:      Chest wall: No tenderness. Abdominal:      General: Bowel sounds are normal. There is no distension. Palpations: Abdomen is soft. There is no mass. Tenderness: There is no abdominal tenderness. Hernia: No hernia is present. Musculoskeletal:         General: Normal range of motion. Cervical back: Normal range of motion and neck supple. Skin:     General: Skin is warm and dry. Findings: No rash. Neurological:      Mental Status: He is alert and oriented to person, place, and time. Psychiatric:         Behavior: Behavior normal.       /86   Pulse 68   Temp 97.3 °F (36.3 °C)   Ht 5' 7\" (1.702 m)   Wt 192 lb (87.1 kg)   SpO2 98%   BMI 30.07 kg/m²    BP Readings from Last 3 Encounters:   04/18/22 118/86   01/09/22 119/76   05/06/21 126/80      Wt Readings from Last 3 Encounters:   04/18/22 192 lb (87.1 kg)   05/06/21 199 lb (90.3 kg)   01/19/21 189 lb (85.7 kg)       ASSESSMENT & PLAN:    1. topically 2 times daily as needed. (Patient not taking: Reported on 4/18/2022) 45 g 0     No current facility-administered medications for this visit. Return in 3 months (on 7/18/2022), or if symptoms worsen or fail to improve, for hypothyroidism, lipidemia, diabetes, establish care. Selin Arrington received counseling on the following healthy behaviors: nutrition, exercise and medication adherence    Patient given educational materials on Diabetes and Hyperlipidemia    I have instructed Selin Arrington to complete a self tracking handout on Blood Sugars  and instructed them to bring it with them to his next appointment. Discussed use, benefit, and side effects of prescribed medications. Barriers to medication compliance addressed. All patient questions answered. Pt voiced understanding. Call office if experience side effects from medications. Please note that some or all of this record was generated using voice recognition software. If there are any questions about the content of this document, please contact the author as some errors in transcription may have occurred.

## 2022-04-18 NOTE — PATIENT INSTRUCTIONS
Sign appropriate paperwork to have records sent to the office    Fasting labs ordered prior to next office visit     Refilled medications    Reviewed low-cholesterol diet    Reviewed diabetic diet    Follow-up in 1 month, sooner if symptoms worsen or persist    [facility] Laboratory Locations - No appointment necessary. @ indicates the location is open Saturdays in addition to Monday through Friday. Call your preferred location for test preparation, business hours and other information you need. [employer] accepts [employer]. [facility]    @ [place] [facility] [address]   Ph: [phone] [facility] Sv[facility]. [address]   Ph: [phone]  @ [facility] Sv[facility]. [address] [facility]   Ph: [phone]    [facility] S[facility]. [address]   Ph: [phone] @ [facility] [address]  Ph: [phone] @ [facility]. [address] [facility] [address] [address] Chris Guillen [facility]   Ph: [phone]    [place]   @ [facility] Sv[facility]. [address] [facility] [address]   Ph: [phone] [facility]. Office Bldg. [address] [facility] [address] [facility]  Ph: [phone] [facility] [address] [place] [address] 30:  [address] Lab Svcs[address] 54 [facility]   Ph: [phone] [facility]. Lab Svcs. [address] [facility] [address]   Ph: [phone]              Patient Education        High Cholesterol: Care Instructions  Overview     Cholesterol is a type of fat in your blood. It is needed for many body functions, such as making new cells. Cholesterol is made by your body. It also comes from food you eat. High cholesterol means that you have too much of thefat in your blood. This raises your risk of a heart attack and stroke.   LDL and HDL are part of your total cholesterol. LDL is the \"bad\" cholesterol. High LDL can raise your risk for coronary artery disease, heart attack, and stroke. HDL is the \"good\" cholesterol. It helps clear bad cholesterol from the body. High HDL is linked with a lower risk of coronary artery disease, heartattack, and stroke. Your cholesterol levels help your doctor find out your risk for having a heart attack or stroke. You and your doctor can talk about whether you need to loweryour risk and what treatment is best for you. Treatment options include a heart-healthy lifestyle and medicine. Both options can help lower your cholesterol and your risk. The way you choose to lower your risk will depend on how high your risk is for heart attack and stroke. It willalso depend on how you feel about taking medicines. Follow-up care is a key part of your treatment and safety. Be sure to make and go to all appointments, and call your doctor if you are having problems. It's also a good idea to know your test results and keep alist of the medicines you take. How can you care for yourself at home?  Eat heart-healthy foods. ? Eat fruits, vegetables, whole grains, beans, and other high-fiber foods. ? Eat lean proteins, such as seafood, lean meats, beans, nuts, and soy products. ? Eat healthy fats, such as canola and olive oil. ? Choose foods that are low in saturated fat. ? Limit sodium and alcohol. ? Limit drinks and foods with added sugar.  Be physically active. Try to do moderate activity at least 2½ hours a week. Or try to do vigorous activity at least 1¼ hours a week. You may want to walk or try other activities, such as running, swimming, cycling, or playing tennis or team sports.  Stay at a healthy weight or lose weight by making the changes in eating and physical activity listed above. Losing just a small amount of weight, even 5 to 10 pounds, can help reduce your risk for having a heart attack or stroke.  Do not smoke.    Manage other health problems. These include diabetes and high blood pressure. If you think you may have a problem with alcohol or drug use, talk to your doctor.  If you take medicine, take it exactly as prescribed. Call your doctor if you think you are having a problem with your medicine.  Check with your doctor or pharmacist before you use any other medicines, including over-the-counter medicines. Make sure your doctor knows all of the medicines, vitamins, herbal products, and supplements you take. Taking some medicines together can cause problems. When should you call for help? Watch closely for changes in your health, and be sure to contact your doctor if:     You need help making lifestyle changes.      You have questions about your medicine. Where can you learn more? Go to https://chpepiceweb.[email or web address].[email or web address] org and sign in to your [employer]pletPlus account. Enter W997 in the [employer] box to learn more about \"High Cholesterol: Care Instructions. \"     If you do not have an account, please click on the \"Sign Up Now\" link. Current as of: January 10, 2022               Content Version: 13.2  © 2006-2022 Healthwise, Incorporated. Care instructions adapted under license by [facility] ([facility]). If you have questions about a medical condition or this instruction, always ask your healthcare professional. [address] any warranty or liability for your use of this information. Patient Education        Learning About High Cholesterol  What is high cholesterol? High cholesterol means that you have too much cholesterol in your blood. Cholesterol is a type of fat. It's needed for many body functions, such as making new cells. Cholesterol is made by your body. It also comes from food youeat. Having high cholesterol can lead to the buildup of plaque in artery walls. Thiscan increase your risk of heart attack and stroke.   When your doctor talks about high cholesterol levels, your doctor is talking about your total cholesterol and LDL cholesterol (the \"bad\" cholesterol) levels. Your doctor may also speak about HDL (the \"good\" cholesterol) levels. High HDL is linked with a lower risk for coronary artery disease, heart attack,and stroke. Your cholesterol levels help your doctor find out your risk for having a heartattack or stroke. How can you help prevent high cholesterol? A heart-healthy lifestyle can help you prevent high cholesterol and lower yourrisk for a heart attack and stroke.  Eat heart-healthy foods. ? Eat fruits, vegetables, whole grains, beans, and other high-fiber foods. ? Eat lean proteins, such as seafood, lean meats, beans, nuts, and soy products. ? Eat healthy fats, such as canola and olive oil. ? Choose foods that are low in saturated fat. ? Limit sodium and alcohol. ? Limit drinks and foods with added sugar.  Be active. Try to do moderate activity at least 2½ hours a week. Or try vigorous activity at least 1¼ hours a week. You may want to walk or try other activities, such as running, swimming, cycling, or playing tennis or team sports.  Stay at a healthy weight. Lose weight if you need to.  Don't smoke. If you need help quitting, talk to your doctor about stop-smoking programs and medicines. These can increase your chances of quitting for good. How is high cholesterol treated? The goal of treatment is to reduce your chances of having a heart attack orstroke. The goal is not to lower your cholesterol numbers only.  Have a heart-healthy lifestyle. This includes eating healthy foods, not smoking, losing weight, and being more active.  You may choose to take medicine. Follow-up care is a key part of your treatment and safety. Be sure to make and go to all appointments, and call your doctor if you are having problems. It's also a good idea to know your test results and keep alist of the medicines you take. Where can you learn more?   Go to https://chpepiceweb.health41st [email or web address] org and sign in to your [employer] account. Enter V194 in the [facility] box to learn more about \"Learning About High Cholesterol. \"     If you do not have an account, please click on the \"Sign Up Now\" link. Current as of: January 10, 2022               Content Version: 13.2  © 2006-2022 [employer] ([employer]). Care instructions adapted under license by [facility] ([facility]). If you have questions about a medical condition or this instruction, always ask your healthcare professional. [address] any warranty or liability for your use of this information. Patient Education        Learning About Carbohydrate (Carb) Counting and Eating Out When You Have Diabetes  Why plan your meals? Meal planning can be a key part of managing diabetes. Planning meals and snacks with the right balance of carbohydrate, protein, and fat can help you keep yourblood sugar at the target level you set with your doctor. You don't have to eat special foods. You can eat what your family eats, including sweets once in a while. But you do have to pay attention to how oftenyou eat and how much you eat of certain foods. You may want to work with a dietitian or a diabetes educator. They can give you tips and meal ideas and can answer your questions about meal planning. This health professional can also help you reach a healthy weight if that is one ofyour goals. What should you know about eating carbs? Managing the amount of carbohydrate (carbs) you eat is an important part ofhealthy meals when you have diabetes. Carbohydrate is found in many foods.  Learn which foods have carbs. And learn the amounts of carbs in different foods. ? Bread, cereal, pasta, and rice have about 15 grams of carbs in a serving. A serving is 1 slice of bread (1 ounce), ½ cup of cooked cereal, or 1/3 cup of cooked pasta or rice. ? Fruits have 15 grams of carbs in a serving.  A serving is 1 small fresh fruit, such as an apple or orange; ½ of a banana; ½ cup of cooked or canned fruit; ½ cup of fruit juice; 1 cup of melon or raspberries; or 2 tablespoons of dried fruit. ? Milk and no-sugar-added yogurt have 15 grams of carbs in a serving. A serving is 1 cup of milk or 3/4 cup (6 oz) of no-sugar-added yogurt. ? Starchy vegetables have 15 grams of carbs in a serving. A serving is ½ cup of mashed potatoes or sweet potato; 1 cup winter squash; ½ of a small baked potato; ½ cup of cooked beans; or ½ cup cooked corn or green peas.  Learn how much carbs to eat each day and at each meal. A dietitian or certified diabetes educator can teach you how to keep track of the amount of carbs you eat. This is called carbohydrate counting.  If you are not sure how to count carbohydrate grams, use the plate method to plan meals. It is a quick way to make sure that you have a balanced meal. It also can help you manage the amount of carbohydrate you eat at meals. ? Divide your plate by types of foods. Put non-starchy vegetables on half the plate, meat or other protein food on one-quarter of the plate, and a grain or starchy vegetable in the final quarter of the plate. To this you can add a small piece of fruit and 1 cup of milk or yogurt, depending on how many carbs you are supposed to eat at a meal.   Try to eat about the same amount of carbs at each meal. Do not \"save up\" your daily allowance of carbs to eat at one meal.   Proteins have very little or no carbs. Examples of proteins are beef, chicken, turkey, fish, eggs, tofu, cheese, cottage cheese, and peanut butter. How can you eat out and still eat healthy?  Learn to estimate the serving sizes of foods that have carbohydrate. If you measure food at home, it will be easier to estimate the amount in a serving of restaurant food.    If the meal you order has too much carbohydrate (such as potatoes, corn, or baked beans), ask to have a low-carbohydrate food instead. Ask for a salad or non-starchy vegetables like broccoli, cauliflower, green beans, or peppers.  If you eat more carbohydrate at a meal than you had planned, take a walk or do other exercise. This will help lower your blood sugar. What are some tips for eating healthy?  Limit saturated fat, such as the fat from meat and dairy products. This is a healthy choice because people who have diabetes are at higher risk of heart disease. So choose lean cuts of meat and nonfat or low-fat dairy products. Use olive or canola oil instead of butter or shortening when cooking.  Don't skip meals. Your blood sugar may drop too low if you skip meals and take insulin or certain medicines for diabetes.  Check with your doctor before you drink alcohol. Alcohol can cause your blood sugar to drop too low. Alcohol can also cause a bad reaction if you take certain diabetes medicines. Follow-up care is a key part of your treatment and safety. Be sure to make and go to all appointments, and call your doctor if you are having problems. It's also a good idea to know your test results and keep alist of the medicines you take. Where can you learn more? Go to https://SocialGuidepeJohn Financial & [email or web address].Bunchball. org and sign in to your [employer]co account. Enter K238 in the [facility] box to learn more about \"Learning About Carbohydrate (Carb) Counting and Eating Out When You Have Diabetes. \"     If you do not have an account, please click on the \"Sign Up Now\" link. Current as of: September 8, 2021               Content Version: 13.2  © 2006-2022 Healthwise, [employer]. Care instructions adapted under license by [facility] ([facility]). If you have questions about a medical condition or this instruction, always ask your healthcare professional. [place] [address] any warranty or liability for your use of this information.          Patient Education        Counting Carbohydrates for Diabetes: Care Instructions  Overview by testing your blood sugar after meals. For example, you may need a certain amount of insulin for every 15 grams of carbs.  Add up the carb grams in a meal. Then you can figure out how many units of insulin to take based on your insulin-to-carb ratio.  Exercise lowers blood sugar. You can use less insulin than you would if you were not doing exercise. Keep in mind that timing matters. If you exercise within 1 hour after a meal, your body may need less insulin for that meal than it would if you exercised 3 hours after the meal. Test your blood sugar to find out how exercise affects your need for insulin. If you do or don't take insulin:   Look at labels on packaged foods. This can tell you how many carbs are in a serving.  Be aware of portions, or serving sizes. If a package has two servings and you eat the whole package, you need to double the number of grams of carbohydrate listed for one serving.  Protein, fat, and fiber do not raise blood sugar as much as carbs do. If you eat a lot of these nutrients in a meal, your blood sugar will rise more slowly than it would otherwise. Where can you learn more? Go to https://TengagedpeAnomalous Networks.Plickers. org and sign in to your [employer]ut account. Enter M091 in the [facility] box to learn more about \"Counting Carbohydrates for Diabetes: Care Instructions. \"     If you do not have an account, please click on the \"Sign Up Now\" link. Current as of: July 28, 2021               Content Version: 13.2  © 2006-2022 Healthwise, [facility]. Care instructions adapted under license by [facility] ([facility]). If you have questions about a medical condition or this instruction, always ask your healthcare professional. [place] [address] any warranty or liability for your use of this information. Patient Education        Learning About Meal Planning for Diabetes  Why plan your meals? Meal planning can be a key part of managing diabetes.  Planning carbohydrate counting, you plan meals based on the amount of carbohydrate in each food. Carbohydrate raises blood sugar higher and more quickly than any other nutrient. It is found in desserts, breads and cereals, and fruit. It's also found in starchy vegetables such as potatoes and corn, grains such as rice and pasta, and milk and yogurt. You can help keep your blood sugar levels within your target range by planning how much carbohydrate to have at meals andsnacks. The amount you need depends on several things. These include your weight, how active you are, which diabetes medicines you take, and what your goals are for your blood sugar levels. A registered dietitian or diabetes educator can helpyou plan how much carbohydrate to include in each meal and snack. An example of a carbohydrate counting plan is:   45 to 60 grams at each meal. That's about the same as 3 to 4 carbohydrate servings.  15 to 20 grams at each snack. That's about the same as 1 carbohydrate serving. The Nutrition Facts label on packaged foods tells you how much carbohydrate is in a serving of the food. First, look at the serving size on the food label. Is that the amount you eat in a serving? All of the nutrition information on a food label is based on that serving size. So if you eat more or less than that, you'll need to adjust the other numbers. Total carbohydrate is the next thing you need to look for on the label. If you count carbohydrate servings, oneserving of carbohydrate is 15 grams. For foods that don't come with labels, such as fresh fruits and vegetables, you'll need a guide that lists carbohydrate in these foods. Ask your doctor, dietitian, or diabetes educator about books or other nutrition guides you canuse. If you take insulin, you need to know how many grams of carbohydrate are in a meal. This lets you know how much rapid-acting insulin to take before you eat.  If you use an insulin pump, you get a constant rate of insulin during the day. So the pump must be programmed at meals to give you extra insulin to cover therise in blood sugar after meals. When you know how much carbohydrate you will eat, you can take the right amount of insulin. Or, if you always use the same amount of insulin, you need to [facility] that you eat the same amount of carbohydrate at meals. If you need more help to understand carbohydrate counting and food labels, askyour doctor, dietitian, or diabetes educator. How can you plan healthy meals? Here are some tips to get started:  [facility] your meals a week at a time. Don't forget to include snacks too.  Use cookbooks or online recipes to plan several main meals. Plan some quick meals for busy nights. You also can double some recipes that freeze well. Then you can save half for other busy nights when you don't have time to cook.  Make sure you have the ingredients you need for your recipes. If you're running low on basic items, put these items on your shopping list too.  List foods that you use to make breakfasts, lunches, and snacks. List plenty of fruits and vegetables.  Post this list on the refrigerator. Add to it as you think of more things you need.  Take the list to the store to do your weekly shopping. Follow-up care is a key part of your treatment and safety. Be sure to make and go to all appointments, and call your doctor if you are having problems. It's also a good idea to know your test results and keep alist of the medicines you take. Where can you learn more? Go to https://MyOptique Groupkira.ADARTIS. org and sign in to your "[employer]" account. Enter M896 in the [facility] box to learn more about \"Learning About Meal Planning for Diabetes. \"     If you do not have an account, please click on the \"Sign Up Now\" link. Current as of: September 8, 2021               Content Version: 13.2  © 0822-6958 Healthwise, Incorporated.    Care instructions adapted under license by [facility] ([facility]). If you have questions about a medical condition or this instruction, always ask your healthcare professional. [place] [address] any warranty or liability for your use of this information.

## 2022-07-17 DIAGNOSIS — E89.0 POSTOPERATIVE HYPOTHYROIDISM: ICD-10-CM

## 2022-07-18 RX ORDER — LEVOTHYROXINE SODIUM 0.12 MG/1
TABLET ORAL
Qty: 90 TABLET | Refills: 0 | OUTPATIENT
Start: 2022-07-18

## 2022-07-27 DIAGNOSIS — E89.0 POSTOPERATIVE HYPOTHYROIDISM: ICD-10-CM

## 2022-07-27 NOTE — TELEPHONE ENCOUNTER
Pt requesting 10 day supply refill till he can make it into his appt Next OV:8/2/22   levothyroxine (SYNTHROID) 125 MCG tablet [[id number]]       Dose, Route, Frequency: As Directed   Dispense Quantity: 90 tablet

## 2022-07-28 RX ORDER — LEVOTHYROXINE SODIUM 0.12 MG/1
TABLET ORAL
Qty: 15 TABLET | Refills: 0 | Status: SHIPPED | OUTPATIENT
Start: 2022-07-28 | End: 2022-08-02 | Stop reason: SDUPTHER

## 2022-08-01 ASSESSMENT — ENCOUNTER SYMPTOMS
COUGH: 0
CHEST TIGHTNESS: 0
DIARRHEA: 0
VOMITING: 0
CONSTIPATION: 0
WHEEZING: 0
NAUSEA: 0
ABDOMINAL PAIN: 0
SHORTNESS OF BREATH: 0

## 2022-08-02 ENCOUNTER — OFFICE VISIT (OUTPATIENT)
Dept: FAMILY MEDICINE CLINIC | Age: 42
End: 2022-08-02

## 2022-08-02 VITALS
SYSTOLIC BLOOD PRESSURE: 138 MMHG | HEART RATE: 73 BPM | TEMPERATURE: 97.3 F | OXYGEN SATURATION: 97 % | BODY MASS INDEX: 30.38 KG/M2 | WEIGHT: 194 LBS | DIASTOLIC BLOOD PRESSURE: 88 MMHG

## 2022-08-02 DIAGNOSIS — E11.9 TYPE 2 DIABETES MELLITUS WITHOUT COMPLICATION, WITHOUT LONG-TERM CURRENT USE OF INSULIN (HCC): ICD-10-CM

## 2022-08-02 DIAGNOSIS — E89.0 POSTOPERATIVE HYPOTHYROIDISM: Primary | ICD-10-CM

## 2022-08-02 DIAGNOSIS — E78.2 MIXED HYPERLIPIDEMIA: ICD-10-CM

## 2022-08-02 LAB
BILIRUBIN, POC: NORMAL
BLOOD URINE, POC: NORMAL
CHP ED QC CHECK: NORMAL
CLARITY, POC: CLEAR
COLOR, POC: YELLOW
GLUCOSE BLD-MCNC: 370 MG/DL
GLUCOSE URINE, POC: 500
KETONES, POC: NORMAL
LEUKOCYTE EST, POC: NORMAL
NITRITE, POC: NORMAL
PH, POC: 7
PROTEIN, POC: NORMAL
SPECIFIC GRAVITY, POC: 1.01
UROBILINOGEN, POC: 2

## 2022-08-02 PROCEDURE — 82962 GLUCOSE BLOOD TEST: CPT | Performed by: CLINICAL NURSE SPECIALIST

## 2022-08-02 PROCEDURE — 81002 URINALYSIS NONAUTO W/O SCOPE: CPT | Performed by: CLINICAL NURSE SPECIALIST

## 2022-08-02 PROCEDURE — 99213 OFFICE O/P EST LOW 20 MIN: CPT | Performed by: CLINICAL NURSE SPECIALIST

## 2022-08-02 PROCEDURE — 83036 HEMOGLOBIN GLYCOSYLATED A1C: CPT | Performed by: CLINICAL NURSE SPECIALIST

## 2022-08-02 RX ORDER — GLIPIZIDE 5 MG/1
TABLET, FILM COATED, EXTENDED RELEASE ORAL
Qty: 90 TABLET | Refills: 0 | Status: SHIPPED | OUTPATIENT
Start: 2022-08-02

## 2022-08-02 RX ORDER — LEVOTHYROXINE SODIUM 0.12 MG/1
TABLET ORAL
Qty: 90 TABLET | Refills: 0 | Status: SHIPPED | OUTPATIENT
Start: 2022-08-02

## 2022-08-02 ASSESSMENT — PATIENT HEALTH QUESTIONNAIRE - PHQ9
SUM OF ALL RESPONSES TO PHQ9 QUESTIONS 1 & 2: 0
2. FEELING DOWN, DEPRESSED OR HOPELESS: 0
SUM OF ALL RESPONSES TO PHQ QUESTIONS 1-9: 0
1. LITTLE INTEREST OR PLEASURE IN DOING THINGS: 0
SUM OF ALL RESPONSES TO PHQ QUESTIONS 1-9: 0

## 2022-08-02 NOTE — PATIENT INSTRUCTIONS
Fasting labs ordered prior to next office visit      Refilled medications     Reviewed low-cholesterol diet     Reviewed diabetic diet     Follow-up in 3 month, sooner if symptoms worsen or persist

## 2022-08-02 NOTE — PROGRESS NOTES
SUBJECTIVE:    Patient ID:  Mariam Calderón is a 38 y.o. male      Patient is her for medication check for hypothyroidism, hyperlipidemia and diabetes. He his doing well on current regimen and has not further concerns. States he has been out of his medication for 1-2 months. His blood sugars have been running 150's to 200's    Thyroid: Patient presents for evaluation of hypothyroidism. Current symptoms include denies fatigue, weight changes, heat/cold intolerance, bowel/skin changes or CVS symptoms. Patient denies fatigue, weight changes, heat/cold intolerance, bowel/skin changes or CVS symptoms. Hyperlipidemia  This is a chronic problem. The current episode started more than 1 year ago. The problem is controlled. Recent lipid tests were reviewed and are low. Exacerbating diseases include diabetes. Pertinent negatives include no chest pain, leg pain, myalgias or shortness of breath. Current antihyperlipidemic treatment includes diet change and exercise. Risk factors for coronary artery disease include male sex, dyslipidemia, diabetes mellitus and obesity. Diabetes  He presents for his follow-up diabetic visit. He has type 2 diabetes mellitus. Onset time: years. His disease course has been stable. Pertinent negatives for hypoglycemia include no headaches. Pertinent negatives for diabetes include no chest pain, no foot paresthesias, no polydipsia, no polyphagia and no polyuria. Current diabetic treatment includes oral agent (dual therapy). He is following a generally healthy diet. His overall blood glucose range is 130-140 mg/dl. Current Outpatient Medications on File Prior to Visit   Medication Sig Dispense Refill    loratadine-pseudoephedrine (CLARITIN-D 12HR) 5-120 MG per extended release tablet Take 1 tablet by mouth 2 times daily 20 tablet 0    Lancets MISC 1 each by Does not apply route daily 100 each 0    triamcinolone (KENALOG) 0.1 % cream Apply topically 2 times daily as needed.  (Patient not taking: Reported on 4/18/2022) 45 g 0    Alcohol Swabs PADS Use with blood sugar monitoring 100 each 0    blood glucose monitor kit and supplies Test 1 time a day fasting & as needed for symptoms of irregular blood glucose. 1 kit 0     No current facility-administered medications on file prior to visit. Past Medical History:   Diagnosis Date    HLD (hyperlipidemia)     T2DM (type 2 diabetes mellitus) ([address]     Thyroid mass      Past Surgical History:   Procedure Laterality Date    EXTERNAL EAR SURGERY Left     THYROIDECTOMY       Family History   Problem Relation Age of Onset    Diabetes Mother     Hypertension Father     Diabetes Brother     Depression Brother     No Known Problems Brother     Diabetes Maternal Grandmother     Diabetes Maternal Grandfather     Heart Attack Maternal Grandfather     Diabetes Paternal Grandmother     Alzheimer's Disease Paternal Grandmother     Hypertension Maternal Aunt     Stroke Maternal Aunt     Diabetes Maternal Aunt     Cancer Maternal Aunt         back/vert    Prostate Cancer Neg Hx      Social History     Socioeconomic History    Marital status: Single     Spouse name: Not on file    Number of children: Not on file    Years of education: Not on file    Highest education level: Not on file   Occupational History    Not on file   Tobacco Use    Smoking status: Never    Smokeless tobacco: Never   Vaping Use    Vaping Use: Never used   Substance and Sexual Activity    Alcohol use: Yes     Comment: rare- 1-2 drinks per month    Drug use: Never    Sexual activity: Yes   Other Topics Concern    Not on file   Social History Narrative    Moved from [facility] in 2019. Works for [employer]. He enjoys going to the movies.      Social Determinants of Health     Financial Resource Strain: Not on file   Food Insecurity: Not on file   Transportation Needs: Not on file   Physical Activity: Not on file   Stress: Not on file   Social Connections: Not on file   Intimate Partner Violence: Not on file   Housing Stability: Not on file       Review of Systems   Constitutional:  Negative for chills and fever. Eyes:  Negative for visual disturbance. Respiratory:  Negative for cough, chest tightness, shortness of breath and wheezing. Cardiovascular:  Negative for chest pain, palpitations and leg swelling. Gastrointestinal:  Negative for abdominal pain, constipation, diarrhea, nausea and vomiting. Endocrine: Negative for cold intolerance, heat intolerance, polydipsia, polyphagia and polyuria. Musculoskeletal:  Negative for arthralgias and myalgias. Skin:  Negative for rash. Neurological:  Negative for headaches. OBJECTIVE:    Physical Exam  Vitals and nursing note reviewed. Constitutional:       General: He is not in acute distress. Appearance: He is well-developed. He is not ill-appearing. HENT:      Head: Normocephalic and atraumatic. Right Ear: External ear normal.      Left Ear: External ear normal.      Nose: Nose normal.      Mouth/Throat:      Mouth: Mucous membranes are moist.   Eyes:      Conjunctiva/sclera: Conjunctivae normal.      Pupils: Pupils are equal, round, and reactive to light. Neck:      Vascular: No carotid bruit. Trachea: No tracheal deviation. Cardiovascular:      Rate and Rhythm: Normal rate and regular rhythm. Heart sounds: Normal heart sounds. Pulmonary:      Effort: Pulmonary effort is normal. No respiratory distress. Breath sounds: Normal breath sounds. No wheezing or rales. Chest:      Chest wall: No tenderness. Abdominal:      General: Bowel sounds are normal. There is no distension. Palpations: Abdomen is soft. There is no mass. Tenderness: There is no abdominal tenderness. Hernia: No hernia is present. Musculoskeletal:         General: Normal range of motion. Cervical back: Normal range of motion and neck supple. Right lower leg: No edema. Left lower leg: No edema.    Skin:     General: Skin is warm and dry. Capillary Refill: Capillary refill takes less than 2 seconds. Findings: No rash. Neurological:      Mental Status: He is alert and oriented to person, place, and time. Psychiatric:         Behavior: Behavior normal.     /88   Pulse 73   Temp 97.3 °F (36.3 °C)   Wt 194 lb (88 kg)   SpO2 97%   BMI 30.38 kg/m²    BP Readings from Last 3 Encounters:   08/02/22 138/88   04/18/22 118/86   01/09/22 119/76      Wt Readings from Last 3 Encounters:   08/02/22 194 lb (88 kg)   04/18/22 192 lb (87.1 kg)   05/06/21 199 lb (90.3 kg)       ASSESSMENT & PLAN:    1. Postoperative hypothyroidism  - Stable, continue current regimen  - levothyroxine (SYNTHROID) 125 MCG tablet; TAKE 1 TAB BY MOUTH UPON WAKING AND WAIT 1 HOUR BEFORE EATING ANYTHING  Dispense: 90 tablet; Refill: 0  - CBC with Auto Differential; Future  - Comprehensive Metabolic Panel; Future  - TSH; Future  - T4, Free; Future    2. Mixed hyperlipidemia  - Stable, continue lifestyle modifications   - Reviewed low cholesterol diet   - CBC with Auto Differential; Future  - Comprehensive Metabolic Panel; Future  - Lipid Panel; Future    3. Type 2 diabetes mellitus without complication, without long-term current use of insulin (HCC)  - Stable, continue current regimen  - Reviewed diagetic diet (307)  - glipiZIDE (GLUCOTROL XL) 5 MG extended release tablet; Take one tablet twice daily. Dispense: 90 tablet; Refill: 0  - metFORMIN (GLUCOPHAGE) 1000 MG tablet; Take 1 tablet by mouth in the morning and 1 tablet in the evening. Take with meals. Dispense: 180 tablet; Refill: 0  - POCT Glucose (500 glucose, trace of ketones)  - POCT Urinalysis no Micro  - POCT glycosylated hemoglobin (Hb A1C)  - CBC with Auto Differential; Future  - Comprehensive Metabolic Panel; Future  - Hemoglobin A1C; Future    Continue current treatment plan.     Current Outpatient Medications   Medication Sig Dispense Refill    levothyroxine (SYNTHROID) 125 MCG tablet TAKE 1 TAB BY MOUTH UPON WAKING AND WAIT 1 HOUR BEFORE EATING ANYTHING 90 tablet 0    glipiZIDE (GLUCOTROL XL) 5 MG extended release tablet Take one tablet twice daily. 90 tablet 0    metFORMIN (GLUCOPHAGE) 1000 MG tablet Take 1 tablet by mouth in the morning and 1 tablet in the evening. Take with meals. 180 tablet 0    loratadine-pseudoephedrine (CLARITIN-D 12HR) 5-120 MG per extended release tablet Take 1 tablet by mouth 2 times daily 20 tablet 0    Lancets MISC 1 each by Does not apply route daily 100 each 0    triamcinolone (KENALOG) 0.1 % cream Apply topically 2 times daily as needed. (Patient not taking: Reported on 4/18/2022) 45 g 0    Alcohol Swabs PADS Use with blood sugar monitoring 100 each 0    blood glucose monitor kit and supplies Test 1 time a day fasting & as needed for symptoms of irregular blood glucose. 1 kit 0     No current facility-administered medications for this visit. Return in about 3 months (around 11/2/2022), or if symptoms worsen or fail to improve, for lipidemia, diabetes, hypothyroidism. Jes Smith received counseling on the following healthy behaviors: nutrition, exercise, and medication adherence    I have instructed Jes Smith to complete a self tracking handout on Blood Sugars and instructed them to bring it with them to his next appointment. Discussed use, benefit, and side effects of prescribed medications. Barriers to medication compliance addressed. All patient questions answered. Pt voiced understanding. Call office if experience side effects from medications. Please note that some or all of this record was generated using voice recognition software. If there are any questions about the content of this document, please contact the author as some errors in transcription may have occurred.

## 2022-09-23 DIAGNOSIS — E78.2 MIXED HYPERLIPIDEMIA: ICD-10-CM

## 2022-09-23 DIAGNOSIS — E89.0 POSTOPERATIVE HYPOTHYROIDISM: ICD-10-CM

## 2022-09-23 DIAGNOSIS — E11.9 TYPE 2 DIABETES MELLITUS WITHOUT COMPLICATION, WITHOUT LONG-TERM CURRENT USE OF INSULIN (HCC): ICD-10-CM

## 2022-09-23 LAB
A/G RATIO: 2.2 (ref 1.1–2.2)
ALBUMIN SERPL-MCNC: 4.4 G/DL (ref 3.4–5)
ALP BLD-CCNC: 114 U/L (ref 40–129)
ALT SERPL-CCNC: 18 U/L (ref 10–40)
ANION GAP SERPL CALCULATED.3IONS-SCNC: 12 MMOL/L (ref 3–16)
AST SERPL-CCNC: 11 U/L (ref 15–37)
BASOPHILS ABSOLUTE: 0 K/UL (ref 0–0.2)
BASOPHILS RELATIVE PERCENT: 0.7 %
BILIRUB SERPL-MCNC: 0.5 MG/DL (ref 0–1)
BUN BLDV-MCNC: 6 MG/DL (ref 7–20)
CALCIUM SERPL-MCNC: 8.6 MG/DL (ref 8.3–10.6)
CHLORIDE BLD-SCNC: 102 MMOL/L (ref 99–110)
CHOLESTEROL, TOTAL: 188 MG/DL (ref 0–199)
CO2: 26 MMOL/L (ref 21–32)
CREAT SERPL-MCNC: 0.8 MG/DL (ref 0.9–1.3)
EOSINOPHILS ABSOLUTE: 0.1 K/UL (ref 0–0.6)
EOSINOPHILS RELATIVE PERCENT: 2 %
GFR AFRICAN AMERICAN: >60
GFR NON-AFRICAN AMERICAN: >60
GLUCOSE BLD-MCNC: 363 MG/DL (ref 70–99)
HCT VFR BLD CALC: 43 % (ref 40.5–52.5)
HDLC SERPL-MCNC: 46 MG/DL (ref 40–60)
HEMOGLOBIN: 14.7 G/DL (ref 13.5–17.5)
LDL CHOLESTEROL CALCULATED: 114 MG/DL
LYMPHOCYTES ABSOLUTE: 1.2 K/UL (ref 1–5.1)
LYMPHOCYTES RELATIVE PERCENT: 21.2 %
MCH RBC QN AUTO: 30.9 PG (ref 26–34)
MCHC RBC AUTO-ENTMCNC: 34.1 G/DL (ref 31–36)
MCV RBC AUTO: 90.5 FL (ref 80–100)
MONOCYTES ABSOLUTE: 0.3 K/UL (ref 0–1.3)
MONOCYTES RELATIVE PERCENT: 4.8 %
NEUTROPHILS ABSOLUTE: 4.1 K/UL (ref 1.7–7.7)
NEUTROPHILS RELATIVE PERCENT: 71.3 %
PDW BLD-RTO: 13.7 % (ref 12.4–15.4)
PLATELET # BLD: 218 K/UL (ref 135–450)
PMV BLD AUTO: 8.1 FL (ref 5–10.5)
POTASSIUM SERPL-SCNC: 4.2 MMOL/L (ref 3.5–5.1)
RBC # BLD: 4.75 M/UL (ref 4.2–5.9)
SODIUM BLD-SCNC: 140 MMOL/L (ref 136–145)
T4 FREE: 1.5 NG/DL (ref 0.9–1.8)
TOTAL PROTEIN: 6.4 G/DL (ref 6.4–8.2)
TRIGL SERPL-MCNC: 142 MG/DL (ref 0–150)
TSH SERPL DL<=0.05 MIU/L-ACNC: 2 UIU/ML (ref 0.27–4.2)
VLDLC SERPL CALC-MCNC: 28 MG/DL
WBC # BLD: 5.7 K/UL (ref 4–11)

## 2022-09-24 LAB
ESTIMATED AVERAGE GLUCOSE: 266.1 MG/DL
HBA1C MFR BLD: 10.9 %

## 2022-11-07 ASSESSMENT — ENCOUNTER SYMPTOMS
SHORTNESS OF BREATH: 0
CHEST TIGHTNESS: 0
CONSTIPATION: 0
NAUSEA: 0
VOMITING: 0
WHEEZING: 0
ABDOMINAL PAIN: 0

## 2022-11-08 ENCOUNTER — OFFICE VISIT (OUTPATIENT)
Dept: FAMILY MEDICINE CLINIC | Age: 42
End: 2022-11-08

## 2022-11-08 VITALS
SYSTOLIC BLOOD PRESSURE: 118 MMHG | OXYGEN SATURATION: 98 % | BODY MASS INDEX: 30.54 KG/M2 | TEMPERATURE: 97.4 F | HEART RATE: 72 BPM | DIASTOLIC BLOOD PRESSURE: 78 MMHG | WEIGHT: 195 LBS

## 2022-11-08 DIAGNOSIS — E89.0 POSTOPERATIVE HYPOTHYROIDISM: Primary | ICD-10-CM

## 2022-11-08 DIAGNOSIS — H61.23 BILATERAL IMPACTED CERUMEN: ICD-10-CM

## 2022-11-08 DIAGNOSIS — E78.2 MIXED HYPERLIPIDEMIA: ICD-10-CM

## 2022-11-08 DIAGNOSIS — J06.9 ACUTE URI: ICD-10-CM

## 2022-11-08 DIAGNOSIS — E11.9 TYPE 2 DIABETES MELLITUS WITHOUT COMPLICATION, WITHOUT LONG-TERM CURRENT USE OF INSULIN (HCC): ICD-10-CM

## 2022-11-08 LAB
CHP ED QC CHECK: NORMAL
GLUCOSE BLD-MCNC: 381 MG/DL

## 2022-11-08 PROCEDURE — 81002 URINALYSIS NONAUTO W/O SCOPE: CPT | Performed by: CLINICAL NURSE SPECIALIST

## 2022-11-08 PROCEDURE — 99214 OFFICE O/P EST MOD 30 MIN: CPT | Performed by: CLINICAL NURSE SPECIALIST

## 2022-11-08 PROCEDURE — 82962 GLUCOSE BLOOD TEST: CPT | Performed by: CLINICAL NURSE SPECIALIST

## 2022-11-08 PROCEDURE — 3046F HEMOGLOBIN A1C LEVEL >9.0%: CPT | Performed by: CLINICAL NURSE SPECIALIST

## 2022-11-08 RX ORDER — GLIPIZIDE 5 MG/1
TABLET, FILM COATED, EXTENDED RELEASE ORAL
Qty: 180 TABLET | Refills: 0 | Status: SHIPPED | OUTPATIENT
Start: 2022-11-08

## 2022-11-08 RX ORDER — LEVOTHYROXINE SODIUM 0.12 MG/1
TABLET ORAL
Qty: 90 TABLET | Refills: 0 | Status: SHIPPED | OUTPATIENT
Start: 2022-11-08

## 2022-11-08 RX ORDER — METFORMIN HYDROCHLORIDE 500 MG/1
1000 TABLET, EXTENDED RELEASE ORAL
Qty: 360 TABLET | Refills: 0 | Status: SHIPPED | OUTPATIENT
Start: 2022-11-08

## 2022-11-08 ASSESSMENT — PATIENT HEALTH QUESTIONNAIRE - PHQ9
2. FEELING DOWN, DEPRESSED OR HOPELESS: 0
SUM OF ALL RESPONSES TO PHQ QUESTIONS 1-9: 0
1. LITTLE INTEREST OR PLEASURE IN DOING THINGS: 0
SUM OF ALL RESPONSES TO PHQ9 QUESTIONS 1 & 2: 0

## 2022-11-08 ASSESSMENT — ENCOUNTER SYMPTOMS
COUGH: 1
RHINORRHEA: 1
SINUS PAIN: 1

## 2022-11-08 NOTE — PROGRESS NOTES
SUBJECTIVE:    Patient ID:  Payal Cuellar is a 38 y.o. male      Patient is he for medication check for hypothyroidism, hyperlipidemia and diabetes. He his doing well on current regimen and has not further concerns. Thyroid: Patient presents for evaluation of hypothyroidism. Current symptoms include denies fatigue, weight changes, heat/cold intolerance, bowel/skin changes or CVS symptoms. Patient denies fatigue, weight changes, heat/cold intolerance, bowel/skin changes or CVS symptoms. Labs reviewed from 9/23/2022 CBC unremarkable CMP essentially normal, fasting glucose 369, A1c 10.9, total cholesterol 181, triglycerides 142, HDL 46, , TSH 2.0, free T4 1.5. States he has been out of his medication for several months before having her blood work done. His blood sugars have come down since restarting medications. He is currently a self-pay, states open enrollment is opened and he should have insurance by the first of the year. Hyperlipidemia  This is a chronic problem. The current episode started more than 1 year ago. The problem is controlled. Recent lipid tests were reviewed and are low. Exacerbating diseases include diabetes and hypothyroidism. Pertinent negatives include no chest pain or shortness of breath. Myalgias: resolved. Current antihyperlipidemic treatment includes exercise and diet change. The current treatment provides moderate improvement of lipids. Risk factors for coronary artery disease include male sex, obesity, dyslipidemia and diabetes mellitus. Diabetes  He presents for his follow-up diabetic visit. He has type 2 diabetes mellitus. His disease course has been worsening. Hypoglycemia symptoms include headaches. Pertinent negatives for hypoglycemia include no nervousness/anxiousness. Pertinent negatives for diabetes include no chest pain, no foot paresthesias, no polydipsia, no polyphagia and no polyuria.  Risk factors for coronary artery disease include dyslipidemia, diabetes mellitus, male sex and obesity. Current diabetic treatment includes oral agent (dual therapy). He is following a generally healthy diet. URI   This is a new problem. Episode onset: over 10 days. The problem has been gradually improving. There has been no fever. Associated symptoms include congestion (head and check), coughing (non-productive), headaches, a plugged ear sensation, rhinorrhea and sinus pain. Pertinent negatives include no abdominal pain, chest pain, nausea, rash, vomiting or wheezing. Diarrhea: slight. Sore throat: improved. Treatments tried: Mucinex, ASA. Current Outpatient Medications on File Prior to Visit   Medication Sig Dispense Refill    metFORMIN (GLUCOPHAGE) 1000 MG tablet Take 1 tablet by mouth in the morning and 1 tablet in the evening. Take with meals. 180 tablet 0    loratadine-pseudoephedrine (CLARITIN-D 12HR) 5-120 MG per extended release tablet Take 1 tablet by mouth 2 times daily 20 tablet 0    Lancets MISC 1 each by Does not apply route daily 100 each 0    triamcinolone (KENALOG) 0.1 % cream Apply topically 2 times daily as needed. (Patient not taking: Reported on 4/18/2022) 45 g 0    Alcohol Swabs PADS Use with blood sugar monitoring 100 each 0    blood glucose monitor kit and supplies Test 1 time a day fasting & as needed for symptoms of irregular blood glucose. 1 kit 0     No current facility-administered medications on file prior to visit.       Past Medical History:   Diagnosis Date    HLD (hyperlipidemia)     T2DM (type 2 diabetes mellitus) ([facility] [address]     Thyroid mass      Past Surgical History:   Procedure Laterality Date    EXTERNAL EAR SURGERY Left     THYROIDECTOMY       Family History   Problem Relation Age of Onset    Diabetes Mother     Hypertension Father     Diabetes Brother     Depression Brother     No Known Problems Brother     Diabetes Maternal Grandmother     Diabetes Maternal Grandfather     Heart Attack Maternal Grandfather     Diabetes disturbance. The patient is not nervous/anxious. OBJECTIVE:    Physical Exam  Vitals and nursing note reviewed. Constitutional:       General: He is not in acute distress. Appearance: He is well-developed. He is not ill-appearing. HENT:      Head: Normocephalic and atraumatic. Right Ear: External ear normal.      Left Ear: External ear normal.      Nose: Nose normal.      Mouth/Throat:      Mouth: Mucous membranes are moist.   Eyes:      Conjunctiva/sclera: Conjunctivae normal.      Pupils: Pupils are equal, round, and reactive to light. Neck:      Vascular: No carotid bruit. Trachea: No tracheal deviation. Cardiovascular:      Rate and Rhythm: Normal rate and regular rhythm. Heart sounds: Normal heart sounds. Pulmonary:      Effort: Pulmonary effort is normal. No respiratory distress. Breath sounds: Normal breath sounds. No wheezing or rales. Chest:      Chest wall: No tenderness. Abdominal:      General: Bowel sounds are normal. There is no distension. Palpations: Abdomen is soft. There is no mass. Tenderness: There is no abdominal tenderness. Hernia: No hernia is present. Musculoskeletal:         General: Normal range of motion. Cervical back: Normal range of motion and neck supple. Right lower leg: No edema. Left lower leg: No edema. Skin:     General: Skin is warm and dry. Capillary Refill: Capillary refill takes less than 2 seconds. Findings: No rash. Neurological:      Mental Status: He is alert and oriented to person, place, and time.    Psychiatric:         Behavior: Behavior normal.     /78   Pulse 72   Temp 97.4 °F (36.3 °C)   Wt 195 lb (88.5 kg)   SpO2 98%   BMI 30.54 kg/m²    BP Readings from Last 3 Encounters:   11/08/22 118/78   08/02/22 138/88   04/18/22 118/86      Wt Readings from Last 3 Encounters:   11/08/22 195 lb (88.5 kg)   08/02/22 194 lb (88 kg)   04/18/22 192 lb (87.1 kg)       ASSESSMENT & PLAN:    1. Postoperative hypothyroidism  - Stable, continue current regimen  - levothyroxine (SYNTHROID) 125 MCG tablet; TAKE 1 TAB BY MOUTH UPON WAKING AND WAIT 1 HOUR BEFORE EATING ANYTHING  Dispense: 90 tablet; Refill: 0    2. Mixed hyperlipidemia  - Stable, continue current regimen  - Reviewed low cholesterol diet   - CBC with Auto Differential; Future  - Comprehensive Metabolic Panel; Future  - Lipid Panel; Future    3. Type 2 diabetes mellitus without complication, without long-term current use of insulin (HCC)  - Stable, continue current regimen  - Reviewed diabetic diet   - CBC with Auto Differential; Future  - Comprehensive Metabolic Panel; Future  - Hemoglobin A1C; Future  - glipiZIDE (GLUCOTROL XL) 5 MG extended release tablet; Take one tablet twice daily. Dispense: 180 tablet; Refill: 0  - POCT Glucose  - POCT Urinalysis no Micro  - metFORMIN (GLUCOPHAGE-XR) 500 MG extended release tablet; Take 2 tablets by mouth daily (with breakfast)  Dispense: 360 tablet; Refill: 0    4. Acute URI  - Flonase 1-2 puffs to each nostril daily.  - Delsym twice a day as needed for cough. - Cepacol Lozenges as needed for sore throat. - Tylenol and or Motrin as needed/directed for pain and fever.    - Encourage rest and fluids. 5. Bilateral impacted cerumen  - carbamide peroxide (DEBROX) 6.5 % otic solution; Place 5 drops into both ears 2 times daily  Dispense: 15 mL; Refill: 0  - Debrox ear wax removal solution twice a day  - Schedule for ear irrigation if needed    Continue current treatment plan. Current Outpatient Medications   Medication Sig Dispense Refill    levothyroxine (SYNTHROID) 125 MCG tablet TAKE 1 TAB BY MOUTH UPON WAKING AND WAIT 1 HOUR BEFORE EATING ANYTHING 90 tablet 0    glipiZIDE (GLUCOTROL XL) 5 MG extended release tablet Take one tablet twice daily.  180 tablet 0    metFORMIN (GLUCOPHAGE-XR) 500 MG extended release tablet Take 2 tablets by mouth daily (with breakfast) 360 tablet 0 carbamide peroxide (DEBROX) 6.5 % otic solution Place 5 drops into both ears 2 times daily 15 mL 0    metFORMIN (GLUCOPHAGE) 1000 MG tablet Take 1 tablet by mouth in the morning and 1 tablet in the evening. Take with meals. 180 tablet 0    loratadine-pseudoephedrine (CLARITIN-D 12HR) 5-120 MG per extended release tablet Take 1 tablet by mouth 2 times daily 20 tablet 0    Lancets MISC 1 each by Does not apply route daily 100 each 0    triamcinolone (KENALOG) 0.1 % cream Apply topically 2 times daily as needed. (Patient not taking: Reported on 4/18/2022) 45 g 0    Alcohol Swabs PADS Use with blood sugar monitoring 100 each 0    blood glucose monitor kit and supplies Test 1 time a day fasting & as needed for symptoms of irregular blood glucose. 1 kit 0     No current facility-administered medications for this visit. Return in about 3 months (around 2/8/2023), or if symptoms worsen or fail to improve, for hypothyroidism, lipidemia, diabetes, URI, cerumen impaction. Kamryn Vazquez received counseling on the following healthy behaviors: nutrition, exercise, and medication adherence    Discussed use, benefit, and side effects of prescribed medications. Barriers to medication compliance addressed. All patient questions answered. Pt voiced understanding. Call office if experience side effects from medications. Please note that some or all of this record was generated using voice recognition software. If there are any questions about the content of this document, please contact the author as some errors in transcription may have occurred.

## 2022-11-08 NOTE — PATIENT INSTRUCTIONS
Fasting labs ordered prior to next office visit      Changed metformin to extended release work up to 2 500 mg tabs twice daily      Reviewed low-cholesterol diet    Discussed statin treatment    Reviewed diabetic diet    Flonase 1-2 puffs to each nostril daily. Delsym twice a day as needed for cough. Cepacol Lozenges as needed for sore throat. Tylenol and or Motrin as needed/directed for pain and fever. Encourage rest and fluids.       Debrox ear wax removal solution twice a day    Schedule for ear irrigation if needed     Follow-up in 3 month, sooner if symptoms worsen or persist

## 2023-02-08 ASSESSMENT — ENCOUNTER SYMPTOMS
WHEEZING: 0
CHEST TIGHTNESS: 0
SHORTNESS OF BREATH: 0

## 2023-02-09 ENCOUNTER — OFFICE VISIT (OUTPATIENT)
Dept: FAMILY MEDICINE CLINIC | Age: 43
End: 2023-02-09

## 2023-02-09 VITALS
HEART RATE: 78 BPM | WEIGHT: 192 LBS | SYSTOLIC BLOOD PRESSURE: 126 MMHG | OXYGEN SATURATION: 97 % | BODY MASS INDEX: 30.07 KG/M2 | DIASTOLIC BLOOD PRESSURE: 88 MMHG | TEMPERATURE: 97.7 F

## 2023-02-09 DIAGNOSIS — E78.2 MIXED HYPERLIPIDEMIA: ICD-10-CM

## 2023-02-09 DIAGNOSIS — E89.0 POSTOPERATIVE HYPOTHYROIDISM: Primary | ICD-10-CM

## 2023-02-09 DIAGNOSIS — E11.9 TYPE 2 DIABETES MELLITUS WITHOUT COMPLICATION, WITHOUT LONG-TERM CURRENT USE OF INSULIN (HCC): ICD-10-CM

## 2023-02-09 DIAGNOSIS — K52.9 ACUTE GASTROENTERITIS: ICD-10-CM

## 2023-02-09 LAB
A/G RATIO: 1.8 (ref 1.1–2.2)
ALBUMIN SERPL-MCNC: 4.2 G/DL (ref 3.4–5)
ALP BLD-CCNC: 106 U/L (ref 40–129)
ALT SERPL-CCNC: 22 U/L (ref 10–40)
ANION GAP SERPL CALCULATED.3IONS-SCNC: 12 MMOL/L (ref 3–16)
AST SERPL-CCNC: 16 U/L (ref 15–37)
BASOPHILS ABSOLUTE: 0 K/UL (ref 0–0.2)
BASOPHILS RELATIVE PERCENT: 0.8 %
BILIRUB SERPL-MCNC: 0.3 MG/DL (ref 0–1)
BILIRUBIN, POC: NORMAL
BLOOD URINE, POC: NORMAL
BUN BLDV-MCNC: 8 MG/DL (ref 7–20)
CALCIUM SERPL-MCNC: 9.3 MG/DL (ref 8.3–10.6)
CHLORIDE BLD-SCNC: 103 MMOL/L (ref 99–110)
CHOLESTEROL, TOTAL: 158 MG/DL (ref 0–199)
CHP ED QC CHECK: NORMAL
CLARITY, POC: CLEAR
CO2: 24 MMOL/L (ref 21–32)
COLOR, POC: YELLOW
CREAT SERPL-MCNC: 0.7 MG/DL (ref 0.9–1.3)
EOSINOPHILS ABSOLUTE: 0.3 K/UL (ref 0–0.6)
EOSINOPHILS RELATIVE PERCENT: 4.6 %
GFR SERPL CREATININE-BSD FRML MDRD: >60 ML/MIN/{1.73_M2}
GLUCOSE BLD-MCNC: 318 MG/DL (ref 70–99)
GLUCOSE BLD-MCNC: 342 MG/DL
GLUCOSE URINE, POC: 500
HCT VFR BLD CALC: 43.8 % (ref 40.5–52.5)
HDLC SERPL-MCNC: 33 MG/DL (ref 40–60)
HEMOGLOBIN: 14.4 G/DL (ref 13.5–17.5)
KETONES, POC: NORMAL
LDL CHOLESTEROL CALCULATED: 94 MG/DL
LEUKOCYTE EST, POC: NORMAL
LYMPHOCYTES ABSOLUTE: 1.2 K/UL (ref 1–5.1)
LYMPHOCYTES RELATIVE PERCENT: 22.2 %
MCH RBC QN AUTO: 29.9 PG (ref 26–34)
MCHC RBC AUTO-ENTMCNC: 32.9 G/DL (ref 31–36)
MCV RBC AUTO: 91.1 FL (ref 80–100)
MONOCYTES ABSOLUTE: 0.3 K/UL (ref 0–1.3)
MONOCYTES RELATIVE PERCENT: 5.7 %
NEUTROPHILS ABSOLUTE: 3.7 K/UL (ref 1.7–7.7)
NEUTROPHILS RELATIVE PERCENT: 66.7 %
NITRITE, POC: NORMAL
PDW BLD-RTO: 13.6 % (ref 12.4–15.4)
PH, POC: 5.5
PLATELET # BLD: 244 K/UL (ref 135–450)
PMV BLD AUTO: 7.9 FL (ref 5–10.5)
POTASSIUM SERPL-SCNC: 4.5 MMOL/L (ref 3.5–5.1)
PROTEIN, POC: NORMAL
RBC # BLD: 4.81 M/UL (ref 4.2–5.9)
SODIUM BLD-SCNC: 139 MMOL/L (ref 136–145)
SPECIFIC GRAVITY, POC: 1.02
T4 FREE: 1.4 NG/DL (ref 0.9–1.8)
TOTAL PROTEIN: 6.6 G/DL (ref 6.4–8.2)
TRIGL SERPL-MCNC: 154 MG/DL (ref 0–150)
TSH SERPL DL<=0.05 MIU/L-ACNC: 6.16 UIU/ML (ref 0.27–4.2)
UROBILINOGEN, POC: 0.2
VLDLC SERPL CALC-MCNC: 31 MG/DL
WBC # BLD: 5.6 K/UL (ref 4–11)

## 2023-02-09 RX ORDER — LEVOTHYROXINE SODIUM 0.12 MG/1
TABLET ORAL
Qty: 90 TABLET | Refills: 0 | Status: SHIPPED | OUTPATIENT
Start: 2023-02-09

## 2023-02-09 RX ORDER — GLIPIZIDE 5 MG/1
TABLET, FILM COATED, EXTENDED RELEASE ORAL
Qty: 180 TABLET | Refills: 0 | Status: SHIPPED | OUTPATIENT
Start: 2023-02-09

## 2023-02-09 RX ORDER — METFORMIN HYDROCHLORIDE 500 MG/1
1000 TABLET, EXTENDED RELEASE ORAL
Qty: 360 TABLET | Refills: 0 | Status: SHIPPED | OUTPATIENT
Start: 2023-02-09

## 2023-02-09 SDOH — ECONOMIC STABILITY: HOUSING INSECURITY
IN THE LAST 12 MONTHS, WAS THERE A TIME WHEN YOU DID NOT HAVE A STEADY PLACE TO SLEEP OR SLEPT IN A SHELTER (INCLUDING NOW)?: NO

## 2023-02-09 SDOH — ECONOMIC STABILITY: INCOME INSECURITY: HOW HARD IS IT FOR YOU TO PAY FOR THE VERY BASICS LIKE FOOD, HOUSING, MEDICAL CARE, AND HEATING?: PATIENT DECLINED

## 2023-02-09 SDOH — ECONOMIC STABILITY: FOOD INSECURITY: WITHIN THE PAST 12 MONTHS, THE FOOD YOU BOUGHT JUST DIDN'T LAST AND YOU DIDN'T HAVE MONEY TO GET MORE.: PATIENT DECLINED

## 2023-02-09 SDOH — ECONOMIC STABILITY: FOOD INSECURITY: WITHIN THE PAST 12 MONTHS, YOU WORRIED THAT YOUR FOOD WOULD RUN OUT BEFORE YOU GOT MONEY TO BUY MORE.: SOMETIMES TRUE

## 2023-02-09 ASSESSMENT — PATIENT HEALTH QUESTIONNAIRE - PHQ9
SUM OF ALL RESPONSES TO PHQ QUESTIONS 1-9: 0
SUM OF ALL RESPONSES TO PHQ9 QUESTIONS 1 & 2: 0
1. LITTLE INTEREST OR PLEASURE IN DOING THINGS: 0
2. FEELING DOWN, DEPRESSED OR HOPELESS: 0
SUM OF ALL RESPONSES TO PHQ QUESTIONS 1-9: 0

## 2023-02-09 ASSESSMENT — ENCOUNTER SYMPTOMS
CHANGE IN BOWEL HABIT: 1
DIARRHEA: 0
CONSTIPATION: 0
SORE THROAT: 0
SWOLLEN GLANDS: 0
NAUSEA: 1
COUGH: 0
VOMITING: 1

## 2023-02-09 NOTE — PROGRESS NOTES
SUBJECTIVE:    Patient ID:  Anette Deluna is a 38 y.o. male      Patient is he for medication check for hypothyroidism, hyperlipidemia and diabetes. He his doing well on current regimen and has not further concerns. Thyroid: Patient presents for evaluation of hypothyroidism. Current symptoms include denies fatigue, weight changes, heat/cold intolerance, bowel/skin changes or CVS symptoms. Patient denies fatigue, weight changes, heat/cold intolerance, bowel/skin changes or CVS symptoms. Labs reviewed from 9/23/2022 CBC unremarkable CMP essentially normal, fasting glucose 369, A1c 10.9, total cholesterol 181, triglycerides 142, HDL 46, , TSH 2.0, free T4 1.5. States he has been out of his medication for several months before having her blood work done. His blood sugars have come down since restarting medications. (Due to insurance issues)    Hyperlipidemia  This is a chronic problem. Recent lipid tests were reviewed and are normal. Exacerbating diseases include diabetes. Pertinent negatives include no chest pain, myalgias or shortness of breath. Current antihyperlipidemic treatment includes exercise and diet change. Risk factors for coronary artery disease include diabetes mellitus, dyslipidemia and male sex. Diabetes  He has type 2 diabetes mellitus. Pertinent negatives for hypoglycemia include no headaches. Pertinent negatives for diabetes include no chest pain, no foot paresthesias, no polydipsia, no polyphagia and no polyuria. Risk factors for coronary artery disease include dyslipidemia, diabetes mellitus and male sex. Current diabetic treatment includes oral agent (dual therapy). He is following a generally healthy diet. Nausea & Vomiting  This is a new problem. Episode onset: 6 days. The problem occurs constantly. The problem has been gradually improving. Associated symptoms include a change in bowel habit (diarrhea), nausea and vomiting.  Pertinent negatives include no anorexia, arthralgias, chest pain, chills, congestion, coughing, fever, headaches, myalgias, rash, sore throat or swollen glands. Abdominal pain: cramping. Exacerbated by: certian foods. Treatments tried: TUMS. Current Outpatient Medications on File Prior to Visit   Medication Sig Dispense Refill    metFORMIN (GLUCOPHAGE) 1000 MG tablet Take 1 tablet by mouth in the morning and 1 tablet in the evening. Take with meals. 180 tablet 0    loratadine-pseudoephedrine (CLARITIN-D 12HR) 5-120 MG per extended release tablet Take 1 tablet by mouth 2 times daily 20 tablet 0    Lancets MISC 1 each by Does not apply route daily 100 each 0    triamcinolone (KENALOG) 0.1 % cream Apply topically 2 times daily as needed. (Patient not taking: Reported on 4/18/2022) 45 g 0    Alcohol Swabs PADS Use with blood sugar monitoring 100 each 0    blood glucose monitor kit and supplies Test 1 time a day fasting & as needed for symptoms of irregular blood glucose. 1 kit 0     No current facility-administered medications on file prior to visit.       Past Medical History:   Diagnosis Date    HLD (hyperlipidemia)     T2DM (type 2 diabetes mellitus) ([facility] [address]     Thyroid mass      Past Surgical History:   Procedure Laterality Date    EXTERNAL EAR SURGERY Left     THYROIDECTOMY       Family History   Problem Relation Age of Onset    Diabetes Mother     Hypertension Father     Diabetes Brother     Depression Brother     No Known Problems Brother     Diabetes Maternal Grandmother     Diabetes Maternal Grandfather     Heart Attack Maternal Grandfather     Diabetes Paternal Grandmother     Alzheimer's Disease Paternal Grandmother     Hypertension Maternal Aunt     Stroke Maternal Aunt     Diabetes Maternal Aunt     Cancer Maternal Aunt         back/vert    Diabetes Maternal Aunt     Stroke Maternal Aunt     Prostate Cancer Neg Hx      Social History     Socioeconomic History    Marital status: Single     Spouse name: Not on file    Number of children: Not on file Years of education: Not on file    Highest education level: Not on file   Occupational History    Not on file   Tobacco Use    Smoking status: Never    Smokeless tobacco: Never   Vaping Use    Vaping Use: Never used   Substance and Sexual Activity    Alcohol use: Yes     Comment: rare- 1-2 drinks per month    Drug use: Never    Sexual activity: Yes   Other Topics Concern    Not on file   Social History Narrative    Moved from hospitals in 2019. Works for [employer]. He enjoys going to the [employer]. Social Determinants of Health     Financial Resource Strain: Not on file   Food Insecurity: Not on file   Transportation Needs: Not on file   Physical Activity: Not on file   Stress: Not on file   Social Connections: Not on file   Intimate Partner Violence: Not on file   Housing Stability: Not on file       Review of Systems   Constitutional:  Negative for chills and fever. HENT:  Negative for congestion and sore throat. Eyes:  Negative for visual disturbance. Respiratory:  Negative for cough, chest tightness, shortness of breath and wheezing. Cardiovascular:  Negative for chest pain and palpitations. Gastrointestinal:  Positive for change in bowel habit (diarrhea), nausea and vomiting. Negative for anorexia, constipation and diarrhea. Abdominal pain: cramping. Endocrine: Negative for polydipsia, polyphagia and polyuria. Musculoskeletal:  Negative for arthralgias and myalgias. Skin:  Negative for rash. Neurological:  Negative for headaches. OBJECTIVE:    Physical Exam  Vitals and nursing note reviewed. Constitutional:       General: He is not in acute distress. Appearance: He is well-developed. He is not ill-appearing. HENT:      Head: Normocephalic and atraumatic.       Right Ear: External ear normal.      Left Ear: External ear normal.      Nose: Nose normal.      Mouth/Throat:      Mouth: Mucous membranes are moist.   Eyes:      Conjunctiva/sclera: Conjunctivae normal.      Pupils: Pupils are equal, round, and reactive to light. Neck:      Trachea: No tracheal deviation. Cardiovascular:      Rate and Rhythm: Normal rate and regular rhythm. Heart sounds: Normal heart sounds. Pulmonary:      Effort: Pulmonary effort is normal. No respiratory distress. Breath sounds: Normal breath sounds. No wheezing or rales. Chest:      Chest wall: No tenderness. Abdominal:      General: Bowel sounds are normal. There is no distension. Palpations: Abdomen is soft. There is no mass. Tenderness: There is no abdominal tenderness. Hernia: No hernia is present. Musculoskeletal:         General: Normal range of motion. Cervical back: Normal range of motion and neck supple. Skin:     General: Skin is warm and dry. Capillary Refill: Capillary refill takes less than 2 seconds. Findings: No rash. Neurological:      Mental Status: He is alert and oriented to person, place, and time. Psychiatric:         Behavior: Behavior normal.     /88   Pulse 78   Temp 97.7 °F (36.5 °C)   Wt 192 lb (87.1 kg)   SpO2 97%   BMI 30.07 kg/m²    BP Readings from Last 3 Encounters:   02/09/23 126/88   11/08/22 118/78   08/02/22 138/88      Wt Readings from Last 3 Encounters:   02/09/23 192 lb (87.1 kg)   11/08/22 195 lb (88.5 kg)   08/02/22 194 lb (88 kg)       ASSESSMENT & PLAN:    1. Postoperative hypothyroidism  - Stable, continue current regimen  - CBC with Auto Differential; Future  - Comprehensive Metabolic Panel; Future  - TSH; Future  - T4, Free; Future  - levothyroxine (SYNTHROID) 125 MCG tablet; TAKE 1 TAB BY MOUTH UPON WAKING AND WAIT 1 HOUR BEFORE EATING ANYTHING  Dispense: 90 tablet; Refill: 0    2. Mixed hyperlipidemia  - Stable, continue current regimen  - CBC with Auto Differential; Future  - Comprehensive Metabolic Panel; Future  - Lipid Panel; Future    3.  Type 2 diabetes mellitus without complication, without long-term current use of insulin (HCC)  - Stable, continue current regimen  - Reviewed diabetic diet  - CBC with Auto Differential; Future  - Comprehensive Metabolic Panel; Future  - Hemoglobin A1C; Future  - metFORMIN (GLUCOPHAGE-XR) 500 MG extended release tablet; Take 2 tablets by mouth 2 times daily (before meals)  Dispense: 360 tablet; Refill: 0  - glipiZIDE (GLUCOTROL XL) 5 MG extended release tablet; Take one tablet twice daily. Dispense: 180 tablet; Refill: 0  - POCT Urinalysis no Micro  - POCT Glucose    4. Acute gastroenteritis  - Small frequent sips and snacks/meals  - Keep diet bland avoid spicy and greasy foods  - BRAT diet (bananas, rice, applesauce and toast)  - Advance as tolerated (scrambled eggs, baked chicken)  - Call if diarrhea persist greater then 5-7 days  - To the ER for increased pain, fever, inability to hold down food and fluids    Continue current treatment plan. Current Outpatient Medications   Medication Sig Dispense Refill    levothyroxine (SYNTHROID) 125 MCG tablet TAKE 1 TAB BY MOUTH UPON WAKING AND WAIT 1 HOUR BEFORE EATING ANYTHING 90 tablet 0    metFORMIN (GLUCOPHAGE-XR) 500 MG extended release tablet Take 2 tablets by mouth 2 times daily (before meals) 360 tablet 0    glipiZIDE (GLUCOTROL XL) 5 MG extended release tablet Take one tablet twice daily. 180 tablet 0    metFORMIN (GLUCOPHAGE) 1000 MG tablet Take 1 tablet by mouth in the morning and 1 tablet in the evening. Take with meals. 180 tablet 0    loratadine-pseudoephedrine (CLARITIN-D 12HR) 5-120 MG per extended release tablet Take 1 tablet by mouth 2 times daily 20 tablet 0    Lancets MISC 1 each by Does not apply route daily 100 each 0    triamcinolone (KENALOG) 0.1 % cream Apply topically 2 times daily as needed. (Patient not taking: Reported on 4/18/2022) 45 g 0    Alcohol Swabs PADS Use with blood sugar monitoring 100 each 0    blood glucose monitor kit and supplies Test 1 time a day fasting & as needed for symptoms of irregular blood glucose.  1 kit 0     No current facility-administered medications for this visit. Return in about 3 months (around 5/9/2023), or if symptoms worsen or fail to improve, for hypothyroidism, lipidemia, diabetes. Camilo Traore received counseling on the following healthy behaviors: nutrition, exercise, and medication adherence    I have instructed Camilo Traore to complete a self tracking handout on Blood Sugars and instructed them to bring it with them to his next appointment. Discussed use, benefit, and side effects of prescribed medications. Barriers to medication compliance addressed. All patient questions answered. Pt voiced understanding. Call office if experience side effects from medications. Please note that some or all of this record was generated using voice recognition software. If there are any questions about the content of this document, please contact the author as some errors in transcription may have occurred.

## 2023-02-09 NOTE — PATIENT INSTRUCTIONS
Fasting labs ordered prior to next office visit      Changed metformin to extended release work up to 2 500 mg tabs twice daily      Reviewed low-cholesterol diet     Discussed statin treatment     Reviewed diabetic diet     Reviewed low cholesterol diet     Small frequent sips and snacks/meals    Keep diet bland avoid spicy and greasy foods    BRAT diet (bananas, rice, applesauce and toast)    Advance as tolerated (scrambled eggs, baked chicken)    Call if diarrhea persist greater then 5-7 days    To the ER for increased pain, fever, inability to hold down food and fluids     Follow-up in 3 month, sooner if symptoms worsen or persist

## 2023-02-10 LAB
ESTIMATED AVERAGE GLUCOSE: 269 MG/DL
HBA1C MFR BLD: 11 %

## 2023-03-15 DIAGNOSIS — E11.9 TYPE 2 DIABETES MELLITUS WITHOUT COMPLICATION, WITHOUT LONG-TERM CURRENT USE OF INSULIN (HCC): Primary | ICD-10-CM

## 2023-05-17 DIAGNOSIS — E11.9 TYPE 2 DIABETES MELLITUS WITHOUT COMPLICATION, WITHOUT LONG-TERM CURRENT USE OF INSULIN (HCC): ICD-10-CM

## 2023-05-18 RX ORDER — SITAGLIPTIN 100 MG/1
TABLET, FILM COATED ORAL
Qty: 30 TABLET | Refills: 0 | Status: SHIPPED | OUTPATIENT
Start: 2023-05-18 | End: 2023-06-05 | Stop reason: SDUPTHER

## 2023-06-04 ASSESSMENT — ENCOUNTER SYMPTOMS
VOMITING: 0
ABDOMINAL PAIN: 0
CHEST TIGHTNESS: 0
CONSTIPATION: 0
WHEEZING: 0
DIARRHEA: 0
SHORTNESS OF BREATH: 0
NAUSEA: 0
COUGH: 0

## 2023-06-05 ENCOUNTER — OFFICE VISIT (OUTPATIENT)
Dept: FAMILY MEDICINE CLINIC | Age: 43
End: 2023-06-05
Payer: COMMERCIAL

## 2023-06-05 VITALS
HEART RATE: 68 BPM | SYSTOLIC BLOOD PRESSURE: 122 MMHG | DIASTOLIC BLOOD PRESSURE: 72 MMHG | OXYGEN SATURATION: 96 % | BODY MASS INDEX: 30.13 KG/M2 | HEIGHT: 67 IN | WEIGHT: 192 LBS

## 2023-06-05 DIAGNOSIS — E78.2 MIXED HYPERLIPIDEMIA: ICD-10-CM

## 2023-06-05 DIAGNOSIS — E89.0 POSTOPERATIVE HYPOTHYROIDISM: Primary | ICD-10-CM

## 2023-06-05 DIAGNOSIS — E11.9 TYPE 2 DIABETES MELLITUS WITHOUT COMPLICATION, WITHOUT LONG-TERM CURRENT USE OF INSULIN (HCC): ICD-10-CM

## 2023-06-05 LAB
BASOPHILS # BLD: 0 K/UL (ref 0–0.2)
BASOPHILS NFR BLD: 0.5 %
CHP ED QC CHECK: NORMAL
DEPRECATED RDW RBC AUTO: 13.9 % (ref 12.4–15.4)
EOSINOPHIL # BLD: 0.3 K/UL (ref 0–0.6)
EOSINOPHIL NFR BLD: 6.4 %
GLUCOSE BLD-MCNC: 266 MG/DL
HCT VFR BLD AUTO: 42.4 % (ref 40.5–52.5)
HGB BLD-MCNC: 14.3 G/DL (ref 13.5–17.5)
LYMPHOCYTES # BLD: 1.2 K/UL (ref 1–5.1)
LYMPHOCYTES NFR BLD: 23.7 %
MCH RBC QN AUTO: 30.6 PG (ref 26–34)
MCHC RBC AUTO-ENTMCNC: 33.7 G/DL (ref 31–36)
MCV RBC AUTO: 90.8 FL (ref 80–100)
MONOCYTES # BLD: 0.2 K/UL (ref 0–1.3)
MONOCYTES NFR BLD: 4.7 %
NEUTROPHILS # BLD: 3.3 K/UL (ref 1.7–7.7)
NEUTROPHILS NFR BLD: 64.7 %
PLATELET # BLD AUTO: 197 K/UL (ref 135–450)
PMV BLD AUTO: 8.3 FL (ref 5–10.5)
RBC # BLD AUTO: 4.67 M/UL (ref 4.2–5.9)
WBC # BLD AUTO: 5.1 K/UL (ref 4–11)

## 2023-06-05 PROCEDURE — 82962 GLUCOSE BLOOD TEST: CPT | Performed by: CLINICAL NURSE SPECIALIST

## 2023-06-05 PROCEDURE — 99214 OFFICE O/P EST MOD 30 MIN: CPT | Performed by: CLINICAL NURSE SPECIALIST

## 2023-06-05 PROCEDURE — 3046F HEMOGLOBIN A1C LEVEL >9.0%: CPT | Performed by: CLINICAL NURSE SPECIALIST

## 2023-06-05 RX ORDER — METFORMIN HYDROCHLORIDE 500 MG/1
1000 TABLET, EXTENDED RELEASE ORAL 2 TIMES DAILY WITH MEALS
Qty: 360 TABLET | Refills: 0 | Status: SHIPPED | OUTPATIENT
Start: 2023-06-05

## 2023-06-05 RX ORDER — LEVOTHYROXINE SODIUM 0.12 MG/1
TABLET ORAL
Qty: 90 TABLET | Refills: 0 | Status: SHIPPED | OUTPATIENT
Start: 2023-06-05

## 2023-06-05 RX ORDER — GLIPIZIDE 5 MG/1
TABLET, FILM COATED, EXTENDED RELEASE ORAL
Qty: 180 TABLET | Refills: 0 | Status: SHIPPED | OUTPATIENT
Start: 2023-06-05

## 2023-06-05 ASSESSMENT — PATIENT HEALTH QUESTIONNAIRE - PHQ9
SUM OF ALL RESPONSES TO PHQ QUESTIONS 1-9: 0
SUM OF ALL RESPONSES TO PHQ QUESTIONS 1-9: 0
2. FEELING DOWN, DEPRESSED OR HOPELESS: 0
SUM OF ALL RESPONSES TO PHQ9 QUESTIONS 1 & 2: 0
SUM OF ALL RESPONSES TO PHQ QUESTIONS 1-9: 0
1. LITTLE INTEREST OR PLEASURE IN DOING THINGS: 0
SUM OF ALL RESPONSES TO PHQ QUESTIONS 1-9: 0

## 2023-06-05 NOTE — PROGRESS NOTES
Okay    Patient is here I want him because she  Chart SUBJECTIVE:    Patient ID:  Nando Wagner is a 38 y.o. male      Patient is he for medication check for hypothyroidism, hyperlipidemia and diabetes. He his doing well on current regimen and has not further concerns. Thyroid: Patient presents for evaluation of hypothyroidism. Current symptoms include denies fatigue, weight changes, heat/cold intolerance, bowel/skin changes or CVS symptoms. Patient denies fatigue, weight changes, heat/cold intolerance, bowel/skin changes or CVS symptoms. States he has been out of his medication for several months before having her blood work done. His blood sugars have come down since restarting medications. (Due to insurance issues)    Patient was last seen on 2/29/2023 and advised to follow-up in 3 months or around 5/9/2023. Discussed medical compliance with the patient, including taking medications as directed, lab work as ordered and appropriate follow-up in order to manage chronic conditions. Patient verbalizes understanding. Hyperlipidemia  This is a chronic problem. The problem is controlled. Recent lipid tests were reviewed and are low. Exacerbating diseases include hypothyroidism. Pertinent negatives include no chest pain, myalgias or shortness of breath. Current antihyperlipidemic treatment includes diet change and exercise. The current treatment provides significant improvement of lipids. Risk factors for coronary artery disease include dyslipidemia and diabetes mellitus. Current Outpatient Medications on File Prior to Visit   Medication Sig Dispense Refill    metFORMIN (GLUCOPHAGE) 1000 MG tablet Take 1 tablet by mouth in the morning and 1 tablet in the evening. Take with meals.  180 tablet 0    loratadine-pseudoephedrine (CLARITIN-D 12HR) 5-120 MG per extended release tablet Take 1 tablet by mouth 2 times daily 20 tablet 0    Lancets MISC 1 each by Does not apply route daily 100 each 0

## 2023-06-05 NOTE — PATIENT INSTRUCTIONS
Fasting labs ordered     Changed metformin to extended release work up to 2 500 mg tabs twice daily      Reviewed low-cholesterol diet     Discussed statin treatment     Reviewed diabetic diet     Reviewed low cholesterol diet     Follow-up in 3 month, sooner if symptoms worsen or persist

## 2023-06-06 LAB
ALBUMIN SERPL-MCNC: 4.2 G/DL (ref 3.4–5)
ALBUMIN/GLOB SERPL: 2 {RATIO} (ref 1.1–2.2)
ALP SERPL-CCNC: 101 U/L (ref 40–129)
ALT SERPL-CCNC: 16 U/L (ref 10–40)
ANION GAP SERPL CALCULATED.3IONS-SCNC: 9 MMOL/L (ref 3–16)
AST SERPL-CCNC: 14 U/L (ref 15–37)
BILIRUB SERPL-MCNC: 0.4 MG/DL (ref 0–1)
BUN SERPL-MCNC: 7 MG/DL (ref 7–20)
CALCIUM SERPL-MCNC: 8.5 MG/DL (ref 8.3–10.6)
CHLORIDE SERPL-SCNC: 105 MMOL/L (ref 99–110)
CHOLEST SERPL-MCNC: 180 MG/DL (ref 0–199)
CO2 SERPL-SCNC: 26 MMOL/L (ref 21–32)
CREAT SERPL-MCNC: 0.7 MG/DL (ref 0.9–1.3)
EST. AVERAGE GLUCOSE BLD GHB EST-MCNC: 240.3 MG/DL
GFR SERPLBLD CREATININE-BSD FMLA CKD-EPI: >60 ML/MIN/{1.73_M2}
GLUCOSE SERPL-MCNC: 255 MG/DL (ref 70–99)
HBA1C MFR BLD: 10 %
HDLC SERPL-MCNC: 43 MG/DL (ref 40–60)
LDLC SERPL CALC-MCNC: 119 MG/DL
POTASSIUM SERPL-SCNC: 4.2 MMOL/L (ref 3.5–5.1)
PROT SERPL-MCNC: 6.3 G/DL (ref 6.4–8.2)
SODIUM SERPL-SCNC: 140 MMOL/L (ref 136–145)
T4 FREE SERPL-MCNC: 1.5 NG/DL (ref 0.9–1.8)
TRIGL SERPL-MCNC: 88 MG/DL (ref 0–150)
TSH SERPL DL<=0.005 MIU/L-ACNC: 4.5 UIU/ML (ref 0.27–4.2)
VLDLC SERPL CALC-MCNC: 18 MG/DL

## 2023-06-09 ENCOUNTER — TELEPHONE (OUTPATIENT)
Dept: ADMINISTRATIVE | Age: 43
End: 2023-06-09

## 2023-06-09 NOTE — TELEPHONE ENCOUNTER
Submitted PA for Januvia 100MG tablets    [facility] Key: [id number] - PA Case ID: [id number] STATUS: No PA required, Prescription is within prescribing limits. Follow up done daily; if no response in three days we will refax for status check. If another three days goes by with no response we will call the insurance for status.

## 2023-07-18 DIAGNOSIS — E89.0 POSTOPERATIVE HYPOTHYROIDISM: ICD-10-CM

## 2023-07-18 NOTE — TELEPHONE ENCOUNTER
His Synthroid was sent in to [facility] on 6/5/2023. If he has already picked it up, we will sent it the [facility] next time.   Thanks

## 2023-07-19 RX ORDER — LEVOTHYROXINE SODIUM 0.12 MG/1
TABLET ORAL
Qty: 90 TABLET | Refills: 0 | Status: CANCELLED | OUTPATIENT
Start: 2023-07-19

## 2023-08-27 ASSESSMENT — ENCOUNTER SYMPTOMS
COUGH: 0
CONSTIPATION: 0
CHEST TIGHTNESS: 0
DIARRHEA: 0
NAUSEA: 0
VOMITING: 0
SHORTNESS OF BREATH: 0
WHEEZING: 0
ABDOMINAL PAIN: 0

## 2023-08-28 ENCOUNTER — OFFICE VISIT (OUTPATIENT)
Dept: FAMILY MEDICINE CLINIC | Age: 43
End: 2023-08-28
Payer: COMMERCIAL

## 2023-08-28 VITALS
TEMPERATURE: 97.4 F | BODY MASS INDEX: 30.7 KG/M2 | SYSTOLIC BLOOD PRESSURE: 120 MMHG | OXYGEN SATURATION: 97 % | HEART RATE: 79 BPM | DIASTOLIC BLOOD PRESSURE: 70 MMHG | WEIGHT: 196 LBS

## 2023-08-28 DIAGNOSIS — E11.9 TYPE 2 DIABETES MELLITUS WITHOUT COMPLICATION, WITHOUT LONG-TERM CURRENT USE OF INSULIN (HCC): ICD-10-CM

## 2023-08-28 DIAGNOSIS — J30.2 SEASONAL ALLERGIES: ICD-10-CM

## 2023-08-28 DIAGNOSIS — E78.2 MIXED HYPERLIPIDEMIA: ICD-10-CM

## 2023-08-28 DIAGNOSIS — E89.0 POSTOPERATIVE HYPOTHYROIDISM: Primary | ICD-10-CM

## 2023-08-28 PROCEDURE — 99214 OFFICE O/P EST MOD 30 MIN: CPT | Performed by: CLINICAL NURSE SPECIALIST

## 2023-08-28 PROCEDURE — 3046F HEMOGLOBIN A1C LEVEL >9.0%: CPT | Performed by: CLINICAL NURSE SPECIALIST

## 2023-08-28 RX ORDER — METFORMIN HYDROCHLORIDE 500 MG/1
1000 TABLET, EXTENDED RELEASE ORAL 2 TIMES DAILY WITH MEALS
Qty: 360 TABLET | Refills: 0 | Status: SHIPPED | OUTPATIENT
Start: 2023-08-28

## 2023-08-28 RX ORDER — GLIPIZIDE 5 MG/1
TABLET, FILM COATED, EXTENDED RELEASE ORAL
Qty: 180 TABLET | Refills: 0 | Status: SHIPPED | OUTPATIENT
Start: 2023-08-28

## 2023-08-28 RX ORDER — LEVOTHYROXINE SODIUM 0.12 MG/1
TABLET ORAL
Qty: 90 TABLET | Refills: 0 | Status: SHIPPED | OUTPATIENT
Start: 2023-08-28

## 2023-08-28 RX ORDER — LORATADINE 10 MG/1
10 TABLET ORAL DAILY
Qty: 90 TABLET | Refills: 0 | Status: SHIPPED | OUTPATIENT
Start: 2023-08-28

## 2023-08-28 RX ORDER — BLOOD PRESSURE TEST KIT
KIT MISCELLANEOUS
Qty: 200 EACH | Refills: 1 | Status: SHIPPED | OUTPATIENT
Start: 2023-08-28

## 2023-08-28 ASSESSMENT — PATIENT HEALTH QUESTIONNAIRE - PHQ9
SUM OF ALL RESPONSES TO PHQ9 QUESTIONS 1 & 2: 0
SUM OF ALL RESPONSES TO PHQ QUESTIONS 1-9: 0
1. LITTLE INTEREST OR PLEASURE IN DOING THINGS: 0
SUM OF ALL RESPONSES TO PHQ QUESTIONS 1-9: 0
SUM OF ALL RESPONSES TO PHQ QUESTIONS 1-9: 0
2. FEELING DOWN, DEPRESSED OR HOPELESS: 0
SUM OF ALL RESPONSES TO PHQ QUESTIONS 1-9: 0

## 2023-08-28 NOTE — PATIENT INSTRUCTIONS
Fasting labs ordered     Changed metformin to extended release work up to two 500 mg tabs twice daily      Reviewed low-cholesterol diet     Discussed statin treatment, depending on next set of labs     Reviewed diabetic diet     Reviewed low cholesterol diet      Follow-up in 3 month, sooner if symptoms worsen or persist

## 2023-11-10 ENCOUNTER — HOSPITAL ENCOUNTER (EMERGENCY)
Age: 43
Discharge: HOME | End: 2023-11-10
Payer: COMMERCIAL

## 2023-11-10 VITALS
RESPIRATION RATE: 18 BRPM | SYSTOLIC BLOOD PRESSURE: 151 MMHG | TEMPERATURE: 97.2 F | HEART RATE: 88 BPM | OXYGEN SATURATION: 95 % | DIASTOLIC BLOOD PRESSURE: 90 MMHG

## 2023-11-10 DIAGNOSIS — J01.90 ACUTE SINUSITIS, RECURRENCE NOT SPECIFIED, UNSPECIFIED LOCATION: Primary | ICD-10-CM

## 2023-11-10 PROCEDURE — 99283 EMERGENCY DEPT VISIT LOW MDM: CPT

## 2023-11-10 PROCEDURE — 6370000000 HC RX 637 (ALT 250 FOR IP): Performed by: PHYSICIAN ASSISTANT

## 2023-11-10 RX ORDER — AMOXICILLIN AND CLAVULANATE POTASSIUM 875; 125 MG/1; MG/1
1 TABLET, FILM COATED ORAL 2 TIMES DAILY
Qty: 14 TABLET | Refills: 0 | Status: SHIPPED | OUTPATIENT
Start: 2023-11-10 | End: 2023-11-17

## 2023-11-10 RX ORDER — AMOXICILLIN AND CLAVULANATE POTASSIUM 875; 125 MG/1; MG/1
1 TABLET, FILM COATED ORAL ONCE
Status: COMPLETED | OUTPATIENT
Start: 2023-11-10 | End: 2023-11-10

## 2023-11-10 RX ADMIN — AMOXICILLIN AND CLAVULANATE POTASSIUM 1 TABLET: 875; 125 TABLET, FILM COATED ORAL at 02:00

## 2023-11-10 ASSESSMENT — ENCOUNTER SYMPTOMS
SINUS PRESSURE: 1
SHORTNESS OF BREATH: 0
NAUSEA: 0
DIARRHEA: 0
COUGH: 0
ABDOMINAL PAIN: 0
RHINORRHEA: 0
VOMITING: 0

## 2023-12-14 DIAGNOSIS — E89.0 POSTOPERATIVE HYPOTHYROIDISM: ICD-10-CM

## 2023-12-14 RX ORDER — LEVOTHYROXINE SODIUM 0.12 MG/1
TABLET ORAL
Qty: 90 TABLET | Refills: 0 | OUTPATIENT
Start: 2023-12-14

## 2023-12-29 DIAGNOSIS — E11.9 TYPE 2 DIABETES MELLITUS WITHOUT COMPLICATION, WITHOUT LONG-TERM CURRENT USE OF INSULIN (HCC): ICD-10-CM

## 2023-12-29 RX ORDER — METFORMIN HYDROCHLORIDE 500 MG/1
1000 TABLET, EXTENDED RELEASE ORAL 2 TIMES DAILY WITH MEALS
Qty: 360 TABLET | Refills: 0 | OUTPATIENT
Start: 2023-12-29

## 2024-02-20 ENCOUNTER — OFFICE VISIT (OUTPATIENT)
Dept: FAMILY MEDICINE CLINIC | Age: 44
End: 2024-02-20
Payer: COMMERCIAL

## 2024-02-20 VITALS
WEIGHT: 202 LBS | BODY MASS INDEX: 31.64 KG/M2 | OXYGEN SATURATION: 99 % | SYSTOLIC BLOOD PRESSURE: 151 MMHG | HEART RATE: 72 BPM | DIASTOLIC BLOOD PRESSURE: 97 MMHG

## 2024-02-20 DIAGNOSIS — Z00.00 ENCOUNTER FOR MEDICAL EXAMINATION TO ESTABLISH CARE: ICD-10-CM

## 2024-02-20 DIAGNOSIS — H61.23 BILATERAL IMPACTED CERUMEN: ICD-10-CM

## 2024-02-20 DIAGNOSIS — E11.9 TYPE 2 DIABETES MELLITUS WITHOUT COMPLICATION, WITHOUT LONG-TERM CURRENT USE OF INSULIN (HCC): ICD-10-CM

## 2024-02-20 DIAGNOSIS — E89.0 POSTOPERATIVE HYPOTHYROIDISM: ICD-10-CM

## 2024-02-20 DIAGNOSIS — Z00.00 ANNUAL PHYSICAL EXAM: Primary | ICD-10-CM

## 2024-02-20 LAB
HBA1C MFR BLD: 11.1 %
Lab: NORMAL
QC PASS/FAIL: NORMAL
SARS-COV-2 RDRP RESP QL NAA+PROBE: NEGATIVE

## 2024-02-20 PROCEDURE — 87635 SARS-COV-2 COVID-19 AMP PRB: CPT | Performed by: STUDENT IN AN ORGANIZED HEALTH CARE EDUCATION/TRAINING PROGRAM

## 2024-02-20 PROCEDURE — 99386 PREV VISIT NEW AGE 40-64: CPT | Performed by: STUDENT IN AN ORGANIZED HEALTH CARE EDUCATION/TRAINING PROGRAM

## 2024-02-20 PROCEDURE — 83036 HEMOGLOBIN GLYCOSYLATED A1C: CPT | Performed by: STUDENT IN AN ORGANIZED HEALTH CARE EDUCATION/TRAINING PROGRAM

## 2024-02-20 RX ORDER — DOXYCYCLINE HYCLATE 20 MG
TABLET ORAL
COMMUNITY
Start: 2024-02-14

## 2024-02-20 RX ORDER — AMOXICILLIN 500 MG/1
CAPSULE ORAL
COMMUNITY
Start: 2024-02-08

## 2024-02-20 RX ORDER — GLIPIZIDE 5 MG/1
TABLET, FILM COATED, EXTENDED RELEASE ORAL
Qty: 180 TABLET | Refills: 1 | Status: SHIPPED | OUTPATIENT
Start: 2024-02-20

## 2024-02-20 RX ORDER — LEVOTHYROXINE SODIUM 0.12 MG/1
TABLET ORAL
Qty: 90 TABLET | Refills: 1 | Status: SHIPPED | OUTPATIENT
Start: 2024-02-20

## 2024-02-20 RX ORDER — ACETAMINOPHEN AND CODEINE PHOSPHATE 300; 30 MG/1; MG/1
TABLET ORAL
COMMUNITY
Start: 2024-02-14

## 2024-02-20 ASSESSMENT — ENCOUNTER SYMPTOMS
CONSTIPATION: 0
SHORTNESS OF BREATH: 0
COUGH: 0
DIARRHEA: 0

## 2024-02-20 ASSESSMENT — PATIENT HEALTH QUESTIONNAIRE - PHQ9
SUM OF ALL RESPONSES TO PHQ QUESTIONS 1-9: 0
1. LITTLE INTEREST OR PLEASURE IN DOING THINGS: 0
SUM OF ALL RESPONSES TO PHQ QUESTIONS 1-9: 0
SUM OF ALL RESPONSES TO PHQ9 QUESTIONS 1 & 2: 0
2. FEELING DOWN, DEPRESSED OR HOPELESS: 0

## 2024-02-20 NOTE — PROGRESS NOTES
recognition software.  Occasionally, words are mistranscribed and despite editing, the text may contain inaccuracies due to incorrect word recognition.  If further clarification is needed please contact the office at [phone].

## 2024-07-24 ENCOUNTER — HOSPITAL ENCOUNTER (EMERGENCY)
Age: 44
Discharge: HOME | End: 2024-07-24
Attending: EMERGENCY MEDICINE
Payer: COMMERCIAL

## 2024-07-24 VITALS
WEIGHT: 199 LBS | HEIGHT: 67 IN | DIASTOLIC BLOOD PRESSURE: 89 MMHG | TEMPERATURE: 98.8 F | SYSTOLIC BLOOD PRESSURE: 141 MMHG | HEART RATE: 71 BPM | RESPIRATION RATE: 16 BRPM | OXYGEN SATURATION: 95 % | BODY MASS INDEX: 31.23 KG/M2

## 2024-07-24 DIAGNOSIS — L03.211 FACIAL CELLULITIS: Primary | ICD-10-CM

## 2024-07-24 DIAGNOSIS — K02.9 DENTAL DECAY: ICD-10-CM

## 2024-07-24 PROCEDURE — 99283 EMERGENCY DEPT VISIT LOW MDM: CPT

## 2024-07-24 PROCEDURE — 6370000000 HC RX 637 (ALT 250 FOR IP): Performed by: EMERGENCY MEDICINE

## 2024-07-24 PROCEDURE — 6360000002 HC RX W HCPCS: Performed by: EMERGENCY MEDICINE

## 2024-07-24 RX ORDER — AMOXICILLIN AND CLAVULANATE POTASSIUM 875; 125 MG/1; MG/1
1 TABLET, FILM COATED ORAL 2 TIMES DAILY
Qty: 14 TABLET | Refills: 0 | Status: SHIPPED | OUTPATIENT
Start: 2024-07-24 | End: 2024-07-31

## 2024-07-24 RX ORDER — CHLORHEXIDINE GLUCONATE ORAL RINSE 1.2 MG/ML
15 SOLUTION DENTAL 3 TIMES DAILY
Qty: 315 ML | Refills: 0 | Status: SHIPPED | OUTPATIENT
Start: 2024-07-24 | End: 2024-07-31

## 2024-07-24 RX ORDER — DEXAMETHASONE 4 MG/1
8 TABLET ORAL ONCE
Status: COMPLETED | OUTPATIENT
Start: 2024-07-24 | End: 2024-07-24

## 2024-07-24 RX ORDER — AMOXICILLIN AND CLAVULANATE POTASSIUM 875; 125 MG/1; MG/1
1 TABLET, FILM COATED ORAL ONCE
Status: COMPLETED | OUTPATIENT
Start: 2024-07-24 | End: 2024-07-24

## 2024-07-24 RX ADMIN — AMOXICILLIN AND CLAVULANATE POTASSIUM 1 TABLET: 875; 125 TABLET, FILM COATED ORAL at 17:36

## 2024-07-24 RX ADMIN — DEXAMETHASONE 8 MG: 4 TABLET ORAL at 17:36

## 2024-07-24 ASSESSMENT — PAIN SCALES - GENERAL: PAINLEVEL_OUTOF10: 0

## 2024-07-24 NOTE — DISCHARGE INSTRUCTIONS
Your prescription has been sent to [facility].    Avoid NSAID medications (ex: Ibuprofen, Advil, Motrin, Naproxen, Aleve, Aspirin, etc.) for the next 3-5 days as they can interact with the medicine we gave you in the emergency department today.    Be sure to contact the clinic listed in your paperwork to arrange for a follow up visit.    If you have any new or worsening issues after going home don't hesitate to return here for reevaluation at any time 24/7!

## 2024-07-25 ENCOUNTER — CARE COORDINATION (OUTPATIENT)
Dept: CARE COORDINATION | Age: 44
End: 2024-07-25

## 2024-07-25 NOTE — CARE COORDINATION
Ambulatory Care Coordination Note     7/25/2024 10:59 AM     Patient outreach attempt by this ACM today to offer care management services. ACM was unable to reach the patient by telephone today; left voice message requesting a return phone call to this ACM.     ACM: Amy Bueno RN     Care Summary Note: ED follow up     PCP/Specialist follow up:

## 2024-07-29 ENCOUNTER — CARE COORDINATION (OUTPATIENT)
Dept: CARE COORDINATION | Age: 44
End: 2024-07-29

## 2024-07-29 NOTE — CARE COORDINATION
Ambulatory Care Coordination Note     7/29/2024 10:00 AM     patient outreach attempt by this AC today to offer care management services. ACM was unable to reach the patient by telephone today; left voice message requesting a return phone call to this ACM.     Patient closed (unable to reach patient) from the High Risk Care Management program on 7/29/2024.

## 2024-08-02 ASSESSMENT — ENCOUNTER SYMPTOMS
SORE THROAT: 0
NAUSEA: 0
ABDOMINAL PAIN: 0
SHORTNESS OF BREATH: 0
TROUBLE SWALLOWING: 0
VOICE CHANGE: 0
VOMITING: 0
FACIAL SWELLING: 1
COUGH: 0

## 2024-08-02 NOTE — ED PROVIDER NOTES
[facility] EMERGENCY DEPARTMENT    Name: Jose Francisco Starkey : 1980 MRN: [id number] Date of Service: 2024    Initial VS: BP: (!) 141/89, Temp: 98.8 °F (37.1 °C), Pulse: 71, Respirations: 16, SpO2: 95 %     CC: facial swelling    HPI: this patient is a 44 y.o. male presenting to the ED from home. Mr. Starkey tells me that over the last 24-36 hours he has noticed slowly increasing swelling to his upper lip on his left side and - to a lesser extent - some swelling overlying his left cheek. He is experiencing minimal soreness to these same areas. He notes that he had one similar episode like this previously, which was attributed to \"an infected tooth.\" He was treated for that previous episode in 2024 with antibiotics alone and he has not since undergone any dental procedures or oral surgeries. He has not appreciate any other changes from his usual state of health and he denies other current complaints.  _____________________________________________________________________    Past Medical History:   Diagnosis Date    Class 1 obesity     HLD (hyperlipidemia)     Hypothyroidism     Type 2 diabetes mellitus       Past Surgical History:   Procedure Laterality Date    EXTERNAL EAR SURGERY Left     THYROIDECTOMY       Family History   Problem Relation Age of Onset    Diabetes Mother     Hypertension Father     Diabetes Brother     Depression Brother     No Known Problems Brother     Diabetes Maternal Grandmother     Diabetes Maternal Grandfather     Heart Attack Maternal Grandfather     Diabetes Paternal Grandmother     Alzheimer's Disease Paternal Grandmother     Hypertension Maternal Aunt     Stroke Maternal Aunt     Diabetes Maternal Aunt     Bone Cancer Maternal Aunt     Diabetes Maternal Aunt     Stroke Maternal Aunt     Prostate Cancer Neg Hx      Social History     Tobacco Use    Smoking status: Never    Smokeless tobacco: Never   Vaping Use    Vaping Use: Never used   Substance Use Topics

## 2024-10-04 DIAGNOSIS — E89.0 POSTOPERATIVE HYPOTHYROIDISM: ICD-10-CM

## 2024-10-04 NOTE — TELEPHONE ENCOUNTER
Medication:   Requested Prescriptions     Pending Prescriptions Disp Refills    levothyroxine (SYNTHROID) 125 MCG tablet [Pharmacy Med Name: Levothyroxine Sodium 125mcg Tablet] 90 tablet 0     Sig: Take 1 tablet by mouth daily upon waking and wait 1 hour before eating anything.       Last Filled:  02/20/24  90 tabs 1 refill     Patient Phone Number: [phone] (home)     Last appt: 2/20/2024   Next appt: Visit date not found    Next appt due : Overdue 02/20/24    Last Thyroid:   Lab Results   Component Value Date/Time    TSH 4.50 06/05/2023 05:00 PM    T4FREE 1.5 06/05/2023 05:00 PM

## 2024-10-07 RX ORDER — LEVOTHYROXINE SODIUM 125 UG/1
TABLET ORAL
Qty: 90 TABLET | Refills: 0 | Status: SHIPPED | OUTPATIENT
Start: 2024-10-07

## 2025-01-07 ENCOUNTER — TELEPHONE (OUTPATIENT)
Dept: FAMILY MEDICINE CLINIC | Age: 45
End: 2025-01-07

## 2025-01-07 NOTE — TELEPHONE ENCOUNTER
Patient sent a my chart message.     Appointment for: Jose Francisco Starkey ([id number])   Visit type: OFFICE VISIT (1004)   1/14/2025 9:15 AM (15 minutes) with Dr. Flo Nunes DO in [facility]      Patient comments:   Annual vist plus blood work

## 2025-01-07 NOTE — TELEPHONE ENCOUNTER
LVMTCB to schedule appt . Please schedule appt for a 30 mn slot . He is overdue for his diabetes follow up

## 2025-01-14 ENCOUNTER — OFFICE VISIT (OUTPATIENT)
Dept: FAMILY MEDICINE CLINIC | Age: 45
End: 2025-01-14

## 2025-01-14 VITALS
OXYGEN SATURATION: 99 % | BODY MASS INDEX: 30.57 KG/M2 | WEIGHT: 194.8 LBS | HEIGHT: 67 IN | DIASTOLIC BLOOD PRESSURE: 78 MMHG | HEART RATE: 75 BPM | SYSTOLIC BLOOD PRESSURE: 124 MMHG

## 2025-01-14 DIAGNOSIS — Z00.00 ENCOUNTER FOR ANNUAL PHYSICAL EXAM: ICD-10-CM

## 2025-01-14 DIAGNOSIS — E78.2 MIXED HYPERLIPIDEMIA: ICD-10-CM

## 2025-01-14 DIAGNOSIS — Z12.5 SCREENING FOR MALIGNANT NEOPLASM OF PROSTATE: ICD-10-CM

## 2025-01-14 DIAGNOSIS — E11.9 TYPE 2 DIABETES MELLITUS WITHOUT COMPLICATION, WITHOUT LONG-TERM CURRENT USE OF INSULIN (HCC): ICD-10-CM

## 2025-01-14 DIAGNOSIS — Z00.00 ENCOUNTER FOR ANNUAL PHYSICAL EXAM: Primary | ICD-10-CM

## 2025-01-14 DIAGNOSIS — E89.0 POSTOPERATIVE HYPOTHYROIDISM: ICD-10-CM

## 2025-01-14 LAB
ALBUMIN SERPL-MCNC: 4.2 G/DL (ref 3.4–5)
ALBUMIN/GLOB SERPL: 2 {RATIO} (ref 1.1–2.2)
ALP SERPL-CCNC: 105 U/L (ref 40–129)
ALT SERPL-CCNC: 17 U/L (ref 10–40)
ANION GAP SERPL CALCULATED.3IONS-SCNC: 10 MMOL/L (ref 3–16)
AST SERPL-CCNC: 14 U/L (ref 15–37)
BASOPHILS # BLD: 0 K/UL (ref 0–0.2)
BASOPHILS NFR BLD: 0.4 %
BILIRUB SERPL-MCNC: 0.5 MG/DL (ref 0–1)
BUN SERPL-MCNC: 10 MG/DL (ref 7–20)
CALCIUM SERPL-MCNC: 9.1 MG/DL (ref 8.3–10.6)
CHLORIDE SERPL-SCNC: 103 MMOL/L (ref 99–110)
CHOLEST SERPL-MCNC: 182 MG/DL (ref 0–199)
CO2 SERPL-SCNC: 27 MMOL/L (ref 21–32)
CREAT SERPL-MCNC: 0.7 MG/DL (ref 0.9–1.3)
DEPRECATED RDW RBC AUTO: 14 % (ref 12.4–15.4)
EOSINOPHIL # BLD: 0.2 K/UL (ref 0–0.6)
EOSINOPHIL NFR BLD: 4.3 %
EST. AVERAGE GLUCOSE BLD GHB EST-MCNC: 251.8 MG/DL
GFR SERPLBLD CREATININE-BSD FMLA CKD-EPI: >90 ML/MIN/{1.73_M2}
GLUCOSE P FAST SERPL-MCNC: 182 MG/DL (ref 70–99)
HBA1C MFR BLD: 10.4 %
HCT VFR BLD AUTO: 43.3 % (ref 40.5–52.5)
HDLC SERPL-MCNC: 45 MG/DL (ref 40–60)
HGB BLD-MCNC: 14.4 G/DL (ref 13.5–17.5)
LDLC SERPL CALC-MCNC: 121 MG/DL
LYMPHOCYTES # BLD: 1.3 K/UL (ref 1–5.1)
LYMPHOCYTES NFR BLD: 24.5 %
MCH RBC QN AUTO: 30 PG (ref 26–34)
MCHC RBC AUTO-ENTMCNC: 33.2 G/DL (ref 31–36)
MCV RBC AUTO: 90.2 FL (ref 80–100)
MONOCYTES # BLD: 0.3 K/UL (ref 0–1.3)
MONOCYTES NFR BLD: 5.1 %
NEUTROPHILS # BLD: 3.5 K/UL (ref 1.7–7.7)
NEUTROPHILS NFR BLD: 65.7 %
PLATELET # BLD AUTO: 222 K/UL (ref 135–450)
PMV BLD AUTO: 8.6 FL (ref 5–10.5)
POTASSIUM SERPL-SCNC: 4.3 MMOL/L (ref 3.5–5.1)
PROT SERPL-MCNC: 6.3 G/DL (ref 6.4–8.2)
PSA SERPL DL<=0.01 NG/ML-MCNC: 0.17 NG/ML (ref 0–4)
RBC # BLD AUTO: 4.8 M/UL (ref 4.2–5.9)
SODIUM SERPL-SCNC: 140 MMOL/L (ref 136–145)
T4 FREE SERPL-MCNC: 0.9 NG/DL (ref 0.9–1.8)
TRIGL SERPL-MCNC: 81 MG/DL (ref 0–150)
TSH SERPL DL<=0.005 MIU/L-ACNC: 15.4 UIU/ML (ref 0.27–4.2)
VLDLC SERPL CALC-MCNC: 16 MG/DL
WBC # BLD AUTO: 5.3 K/UL (ref 4–11)

## 2025-01-14 RX ORDER — METFORMIN HYDROCHLORIDE 500 MG/1
1000 TABLET, EXTENDED RELEASE ORAL 2 TIMES DAILY WITH MEALS
Qty: 360 TABLET | Refills: 1 | Status: SHIPPED | OUTPATIENT
Start: 2025-01-14 | End: 2025-07-13

## 2025-01-14 ASSESSMENT — ENCOUNTER SYMPTOMS
CONSTIPATION: 0
SHORTNESS OF BREATH: 0
DIARRHEA: 0
COUGH: 0

## 2025-01-14 NOTE — PROGRESS NOTES
Jose Francisco Starkey is a 44 y.o. year old male here for:    Chief Complaint:    Chief Complaint   Patient presents with    Diabetes    Annual Exam     Pt denies any concerns requesting labs currently fasting for labs        Subjective:         HPI:     Patient here for annual physical exam.  Also following up regarding hyperlipidemia and type 2 diabetes.  States that he has been taking his medications about 75% of the time and has not radically altered his diet.  No other significant changes has been made.  He states that he feels well and has no significant concerns from that standpoint, but is expecting his lab work to not be within normal limits.  Otherwise, he has no acute concerns.    Past Medical History:   Diagnosis Date    Class 1 obesity     HLD (hyperlipidemia)     Hypothyroidism     Type 2 diabetes mellitus      Social History     Tobacco Use    Smoking status: Never    Smokeless tobacco: Never   Vaping Use    Vaping status: Never Used   Substance Use Topics    Alcohol use: Yes     Alcohol/week: 0.0 - 2.0 standard drinks of alcohol    Drug use: Never     Family History   Problem Relation Age of Onset    Diabetes Mother     Hypertension Father     Diabetes Brother     Depression Brother     No Known Problems Brother     Diabetes Maternal Grandmother     Diabetes Maternal Grandfather     Heart Attack Maternal Grandfather     Diabetes Paternal Grandmother     Alzheimer's Disease Paternal Grandmother     Hypertension Maternal Aunt     Stroke Maternal Aunt     Diabetes Maternal Aunt     Bone Cancer Maternal Aunt     Diabetes Maternal Aunt     Stroke Maternal Aunt     Cancer Maternal Aunt     Prostate Cancer Neg Hx      Past Surgical History:   Procedure Laterality Date    EXTERNAL EAR SURGERY Left     THYROIDECTOMY           Current Outpatient Medications:     metFORMIN (GLUCOPHAGE-XR) 500 MG extended release tablet, Take 2 tablets by mouth 2 times daily (with meals), Disp: 360 tablet, Rfl: 1    levothyroxine  Yes

## 2025-01-30 DIAGNOSIS — E11.9 TYPE 2 DIABETES MELLITUS WITHOUT COMPLICATION, WITHOUT LONG-TERM CURRENT USE OF INSULIN (HCC): ICD-10-CM

## 2025-01-30 RX ORDER — GLIPIZIDE 5 MG/1
TABLET, FILM COATED, EXTENDED RELEASE ORAL
Qty: 180 TABLET | Refills: 0 | Status: SHIPPED | OUTPATIENT
Start: 2025-01-30

## 2025-01-30 NOTE — TELEPHONE ENCOUNTER
Medication:   Requested Prescriptions     Pending Prescriptions Disp Refills    glipiZIDE (GLUCOTROL XL) 5 MG extended release tablet [Pharmacy Med Name: Glipizide 5mg Extended-Release Tablet] 180 tablet 0     Sig: Take 1 tablet by mouth twice daily.       Last Filled:  2/20/2024, 180, 1    Patient Phone Number: [phone] (home)     Last appt: 1/14/2025   Next appt: 5/15/2025    Last Labs DM:   Lab Results   Component Value Date/Time    LABA1C 10.4 01/14/2025 09:59 AM

## 2025-03-22 DIAGNOSIS — E89.0 POSTOPERATIVE HYPOTHYROIDISM: ICD-10-CM

## 2025-03-24 RX ORDER — LEVOTHYROXINE SODIUM 125 UG/1
TABLET ORAL
Qty: 90 TABLET | Refills: 0 | Status: SHIPPED | OUTPATIENT
Start: 2025-03-24

## 2025-03-24 NOTE — TELEPHONE ENCOUNTER
Medication:   Requested Prescriptions     Pending Prescriptions Disp Refills    levothyroxine (SYNTHROID) 125 MCG tablet [Pharmacy Med Name: Levothyroxine Sodium 125mcg Tablet] 90 tablet 0     Sig: Take 1 tablet by mouth daily upon waking and wait 1 hour before eating anything.       Last Filled:  10/07/2024    Patient Phone Number: [phone] (home)     Last appt: 1/14/2025   Next appt: 5/15/2025    Last Thyroid:   Lab Results   Component Value Date/Time    TSH 4.50 06/05/2023 05:00 PM    T4FREE 0.9 01/14/2025 09:59 AM

## 2025-05-29 ENCOUNTER — OFFICE VISIT (OUTPATIENT)
Dept: FAMILY MEDICINE CLINIC | Age: 45
End: 2025-05-29

## 2025-05-29 VITALS
SYSTOLIC BLOOD PRESSURE: 126 MMHG | OXYGEN SATURATION: 98 % | HEIGHT: 67 IN | BODY MASS INDEX: 30.98 KG/M2 | DIASTOLIC BLOOD PRESSURE: 81 MMHG | HEART RATE: 84 BPM | WEIGHT: 197.4 LBS

## 2025-05-29 DIAGNOSIS — E78.2 MIXED HYPERLIPIDEMIA: ICD-10-CM

## 2025-05-29 DIAGNOSIS — E11.9 TYPE 2 DIABETES MELLITUS WITHOUT COMPLICATION, WITHOUT LONG-TERM CURRENT USE OF INSULIN (HCC): Primary | ICD-10-CM

## 2025-05-29 DIAGNOSIS — E89.0 POSTOPERATIVE HYPOTHYROIDISM: ICD-10-CM

## 2025-05-29 PROBLEM — E11.65 TYPE 2 DIABETES MELLITUS WITH HYPERGLYCEMIA (HCC): Status: ACTIVE | Noted: 2025-05-29

## 2025-05-29 LAB — HBA1C MFR BLD: 9.2 %

## 2025-05-29 RX ORDER — DAPAGLIFLOZIN 5 MG/1
TABLET, FILM COATED ORAL
Qty: 150 TABLET | Refills: 0 | Status: SHIPPED | OUTPATIENT
Start: 2025-05-29 | End: 2025-08-27

## 2025-05-29 SDOH — ECONOMIC STABILITY: FOOD INSECURITY: WITHIN THE PAST 12 MONTHS, YOU WORRIED THAT YOUR FOOD WOULD RUN OUT BEFORE YOU GOT MONEY TO BUY MORE.: NEVER TRUE

## 2025-05-29 SDOH — ECONOMIC STABILITY: FOOD INSECURITY: WITHIN THE PAST 12 MONTHS, THE FOOD YOU BOUGHT JUST DIDN'T LAST AND YOU DIDN'T HAVE MONEY TO GET MORE.: NEVER TRUE

## 2025-05-29 NOTE — PATIENT INSTRUCTIONS
Ask your insurance about coverage for Farxiga. If they don't cover Farxiga, ask about coverage for SGLT inhibitors (in general) and GLP-1 (in general).

## 2025-05-30 NOTE — PROGRESS NOTES
Jose Francisco Starkey is a 44 y.o. year old male here for:    Chief Complaint:    Chief Complaint   Patient presents with    Diabetes    Hyperlipidemia    Follow-up       Subjective:         HPI:     Patient presenting for routine diabetic follow-up.  Reports that he has been able to take all of his medications as directed.  No significant problems with side effects.  No other acute concerns    Past Medical History:   Diagnosis Date    Class 1 obesity     HLD (hyperlipidemia)     Hypothyroidism     Type 2 diabetes mellitus      Social History     Tobacco Use    Smoking status: Never    Smokeless tobacco: Never   Vaping Use    Vaping status: Never Used   Substance Use Topics    Alcohol use: Yes     Alcohol/week: 0.0 - 2.0 standard drinks of alcohol    Drug use: Never     Family History   Problem Relation Age of Onset    Diabetes Mother     Hypertension Father     Diabetes Brother     Depression Brother     No Known Problems Brother     Diabetes Maternal Grandmother     Diabetes Maternal Grandfather     Heart Attack Maternal Grandfather     Diabetes Paternal Grandmother     Alzheimer's Disease Paternal Grandmother     Hypertension Maternal Aunt     Stroke Maternal Aunt     Diabetes Maternal Aunt     Bone Cancer Maternal Aunt     Diabetes Maternal Aunt     Stroke Maternal Aunt     Cancer Maternal Aunt     Prostate Cancer Neg Hx      Past Surgical History:   Procedure Laterality Date    EXTERNAL EAR SURGERY Left     THYROIDECTOMY           Current Outpatient Medications:     dapagliflozin (FARXIGA) 5 MG tablet, Take 1 tablet by mouth every morning for 30 days, THEN 2 tablets every morning., Disp: 150 tablet, Rfl: 0    levothyroxine (SYNTHROID) 125 MCG tablet, Take 1 tablet by mouth daily upon waking and wait 1 hour before eating anything., Disp: 90 tablet, Rfl: 0    glipiZIDE (GLUCOTROL XL) 5 MG extended release tablet, Take 1 tablet by mouth twice daily., Disp: 180 tablet, Rfl: 0    metFORMIN (GLUCOPHAGE-XR) 500 MG extended

## 2025-06-06 NOTE — ED NOTES
Pt Discharged in stable condition, VSS, no signs of distress, discharge instructions and meds reviewed. Pt verbalizes understanding and states no further questions or concerns unaddressed.        Ayesha Hernandez RN  01/09/22 0893
PROVIDER:[TOKEN:[[id number]]]